# Patient Record
Sex: MALE | Race: WHITE | Employment: STUDENT | URBAN - NONMETROPOLITAN AREA
[De-identification: names, ages, dates, MRNs, and addresses within clinical notes are randomized per-mention and may not be internally consistent; named-entity substitution may affect disease eponyms.]

---
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
## 2021-11-30 NOTE — PROGRESS NOTES
11/30/21  @ 0476 Pt verbalizes understanding of PAT instructions. Pt is having H&P  done at [facility] through football department and will bring day of surgery.   Ester Liriano

## 2021-11-30 NOTE — PROGRESS NOTES
[address] [facility] patients having surgery or anesthesia are required to be Covid tested OR to have been vaccinated at least 14 days prior to your procedure. It is very important to return our call to [phone] and notify the staff of your last vaccination date otherwise you will be required to complete Covid PCR test within the 5-6 days prior to surgery & quarantine. The results will need to be faxed to PreAdmission Testing at [phone]. PRIOR TO PROCEDURE DATE:        1. PLEASE FOLLOW ANY  GUIDELINES/ INSTRUCTIONS PRIOR TO YOUR PROCEDURE AS ADVISED BY YOUR SURGEON. 2. Arrange for someone to drive you home and be with you for the first 24 hours after discharge for your safety after your procedure for which you received sedation. Ensure it is someone we can share information with regarding your discharge. 3. You must contact your surgeon for instructions IF:   You are taking any blood thinners, aspirin, anti-inflammatory or vitamin E.   There is a change in your physical condition such as a cold, fever, rash, cuts, sores or any other infection, especially near your surgical site. 4. Do not drink alcohol the day before or day of your procedure. 5. A Pre-op History and Physical for surgery MUST be completed by your Physician or Urgent Care within 30 days of your procedure date. Please bring a copy with you on the day of your procedure and along with any other testing performed. THE DAY OF YOUR PROCEDURE:  1. Follow instructions for ARRIVAL TIME as DIRECTED BY YOUR SURGEON. 2. Enter the MAIN entrance from [address] [place] [address] and follow the signs to the free [employer] or [employer] parking (offered free of charge 6am-5pm). 3. Enter the Main Entrance of the hospital (do not enter from the lower level of the parking garage). Upon entrance, check in with the  at the main desk on your left. while you are in surgery. 10. If you use a CPAP, please bring it with you on the day of your procedure. 11. We recommend that valuable personal  belongings such as cash, cell phones, e-tablets or jewelry, be left at home during your stay. The hospital will not be responsible for valuables that are not secured in the hospital safe. However, if your insurance requires a co-pay, you may want to bring a method of payment, i.e. Check or credit card, if you wish to pay your co-pay the day of surgery. 12. If you are to stay overnight, you may bring a bag with personal items. Please have any large items you may need brought in by your family after your arrival to your hospital room. 15. If you have a Living Will or Durable Power of , please bring a copy on the day of your procedure. 15. With your permission, one family member may accompany you while you are being prepared for surgery. Once you are ready, additional family members may join you. HOW WE KEEP YOU SAFE and WORK TO PREVENT SURGICAL SITE INFECTIONS:  1. Health care workers should always check your ID bracelet to verify your name and birth date. You will be asked many times to state your name, date of birth, and allergies. 2. Health care workers should always clean their hands with soap or alcohol gel before providing care to you. It is okay to ask anyone if they cleaned their hands before they touch you. 3. You will be actively involved in verifying the type of procedure you are having and ensuring the correct surgical site. This will be confirmed multiple times prior to your procedure. Do NOT matthew your surgery site UNLESS instructed to by your surgeon. 4. Do not shave or wax for 72 hours prior to procedure near your operative site. Shaving with a razor can irritate your skin and make it easier to develop an infection.  On the day of your procedure, any hair that needs to be removed near the surgical site will be clipped by a healthcare worker using a special clippers designed to avoid skin irritation. 5. When you are in the operating room, your surgical site will be cleansed with a special soap, and in most cases, you will be given an antibiotic before the surgery begins. What to expect AFTER YOUR PROCEDURE:  1. Immediately following your procedure, your will be taken to the PACU for the first phase of your recovery. Your nurse will help you recover from any potential side effects of anesthesia, such as extreme drowsiness, changes in your vital signs or breathing patterns. Nausea, headache, muscle aches, or sore throat may also occur after anesthesia. Your nurse will help you manage these potential side effects. 2. For comfort and safety, arrange to have someone at home with you for the first 24 hours after discharge. 3. You and your family will be given written instructions about your diet, activity, dressing care, medications, and return visits. 4. Once at home, should issues with nausea, pain, or bleeding occur, or should you notice any signs of infection, you should call your surgeon. 5. Always clean your hands before and after caring for your wound. Do not let your family touch your surgery site without cleaning their hands. 6. Narcotic pain medications can cause significant constipation. You may want to add a stool softener to your postoperative medication schedule or speak to your surgeon on how best to manage this SIDE EFFECT. SPECIAL INSTRUCTIONS Bring H&P day of surgery    Thank you for allowing us to care for you. We strive to exceed your expectations in the delivery of care and service provided to you and your family. If you need to contact the [place] [address] staff for any reason, please call us at [phone]    Instructions reviewed with patient during preadmission testing phone interview.   Una Lyn RN.11/30/2021 .2:14 PM      ADDITIONAL EDUCATIONAL INFORMATION REVIEWED PER PHONE WITH YOU AND/OR YOUR FAMILY:     Yes Antibacterial Soap

## 2021-12-01 NOTE — PROGRESS NOTES
Place patient label inside box (if no patient label, complete below)  Name:  :  MR#:   Meredith Sandoval / PROCEDURE  1. I (we), Flora Dave (Patient Name) authorize DR. Kathleen Canela (Provider / Yvon Winter) and/or such assistants as may be selected by him/her, to perform the following operation/procedure(s): OPERATIVE FIXATION OPEN REDUCTION INTERNAL FIXATION LEFT INDEX FINGER PROXIMAL PHALANX FRACTURE; ANY INDICATED PROCEDURES        Note: If unable to obtain consent prior to an emergent procedure, document the emergent reason in the medical record. This procedure has been explained to my (our) satisfaction and included in the explanation was:  A) The intended benefit, nature, and extent of the procedure to be performed;  B) The significant risks involved and the probability of success;  C) Alternative procedures and methods of treatment;  D) The dangers and probable consequences of such alternatives (including no procedure or treatment); E) The expected consequences of the procedure on my future health;  F) Whether other qualified individuals would be performing important surgical tasks and/or whether  would be present to advise or support the procedure. I (we) understand that there are other risks of infection and other serious complications in the pre-operative/procedural and postoperative/procedural stages of my (our) care. I (we) have asked all of the questions which I (we) thought were important in deciding whether or not to undergo treatment or diagnosis. These questions have been answered to my (our) satisfaction. I (we) understand that no assurance can be given that the procedure will be a success, and no guarantee or warranty of success has been given to me (us).     2. It has been explained to me (us) that during the course of the operation/procedure, unforeseen conditions may be revealed that necessitate extension of the original procedure(s) or different procedure(s) than those set forth in Paragraph 1. I (we) authorize and request that the above-named physician, his/her assistants or his/her designees, perform procedures as necessary and desirable if deemed to be in my (our) best interest.     Revised 8/2/2021                                                                          Page 1 of 2       3. I acknowledge that health care personnel may be observing this procedure for the purpose of medical education or other specified purposes as may be necessary as requested and/or approved by my (our) physician. 4. I (we) consent to the disposal by the hospital Pathologist of the removed tissue, parts or organs in accordance with hospital policy. 5. I do ____ do not ____ consent to the use of a local infiltration pain blocking agent that will be used by my provider/surgical provider to help alleviate pain during my procedure. 6. I do ____ do not ____ consent to an emergent blood transfusion in the case of a life-threatening situation that requires blood components to be administered. This consent is valid for 24 hours from the beginning of the procedure. 7. This patient does ____ or does not ____ currently have a DNR status/order. If DNR order is in place, obtain Addendum to the Surgical Consent for ALL Patients with a DNR Order to address renuka-operative status for limited intervention or DNR suspension.      8. I have read and fully understand the above Consent for Operation/Procedure and that all blanks were completed before I signed the consent.   _____________________________       _____________________      ____/____am/pm  Signature of Patient or legal representative      Printed Name / Relationship            Date / Time   ____________________________       _____________________      ____/____am/pm  Witness to Signature                                    Printed Name                    Date / Time     If patient is unable to sign or is a minor, complete the following)  Patient is a minor, ____ years of age, or unable to sign because:   ______________________________________________________________________________________________    Tierney If a phone consent is obtained, consent will be documented by using two health care professionals, each affirming that the consenting party has no questions and gives consent for the procedure discussed with the physician/provider.   _____________________          ____________________       _____/_____am/pm   2nd witness to phone consent        Printed name           Date / Time    Informed Consent:  I have provided the explanation described above in section 1 to the patient and/or legal representative.  I have provided the patient and/or legal representative with an opportunity to ask any questions about the proposed operation/procedure.   ___________________________          ____________________         ____/____am/pm  Provider / Proceduralist                            Printed name            Date / Time  Revised 8/2/2021                                                                      Page 2 of 2

## 2021-12-02 ENCOUNTER — ANESTHESIA EVENT (OUTPATIENT)
Dept: OPERATING ROOM | Age: 21
End: 2021-12-02
Payer: COMMERCIAL

## 2021-12-03 ENCOUNTER — HOSPITAL ENCOUNTER (OUTPATIENT)
Age: 21
Setting detail: OUTPATIENT SURGERY
Discharge: HOME | End: 2021-12-03
Attending: ORTHOPAEDIC SURGERY | Admitting: ORTHOPAEDIC SURGERY
Payer: COMMERCIAL

## 2021-12-03 ENCOUNTER — ANESTHESIA (OUTPATIENT)
Dept: OPERATING ROOM | Age: 21
End: 2021-12-03
Payer: COMMERCIAL

## 2021-12-03 VITALS
OXYGEN SATURATION: 99 % | HEIGHT: 76 IN | DIASTOLIC BLOOD PRESSURE: 85 MMHG | WEIGHT: 230 LBS | BODY MASS INDEX: 28.01 KG/M2 | TEMPERATURE: 98.5 F | RESPIRATION RATE: 16 BRPM | HEART RATE: 68 BPM | SYSTOLIC BLOOD PRESSURE: 153 MMHG

## 2021-12-03 VITALS — SYSTOLIC BLOOD PRESSURE: 130 MMHG | DIASTOLIC BLOOD PRESSURE: 71 MMHG | TEMPERATURE: 97 F | OXYGEN SATURATION: 100 %

## 2021-12-03 DIAGNOSIS — S62.611A CLOSED DISPLACED FRACTURE OF PROXIMAL PHALANX OF LEFT INDEX FINGER, INITIAL ENCOUNTER: Primary | ICD-10-CM

## 2021-12-03 PROCEDURE — C1713 ANCHOR/SCREW BN/BN,TIS/BN: HCPCS | Performed by: ORTHOPAEDIC SURGERY

## 2021-12-03 PROCEDURE — 7100000010 HC PHASE II RECOVERY - FIRST 15 MIN: Performed by: ORTHOPAEDIC SURGERY

## 2021-12-03 PROCEDURE — 7100000011 HC PHASE II RECOVERY - ADDTL 15 MIN: Performed by: ORTHOPAEDIC SURGERY

## 2021-12-03 PROCEDURE — 6360000002 HC RX W HCPCS: Performed by: NURSE ANESTHETIST, CERTIFIED REGISTERED

## 2021-12-03 PROCEDURE — 3600000004 HC SURGERY LEVEL 4 BASE: Performed by: ORTHOPAEDIC SURGERY

## 2021-12-03 PROCEDURE — 3700000000 HC ANESTHESIA ATTENDED CARE: Performed by: ORTHOPAEDIC SURGERY

## 2021-12-03 PROCEDURE — 6360000002 HC RX W HCPCS: Performed by: ORTHOPAEDIC SURGERY

## 2021-12-03 PROCEDURE — 7100000000 HC PACU RECOVERY - FIRST 15 MIN: Performed by: ORTHOPAEDIC SURGERY

## 2021-12-03 PROCEDURE — 2580000003 HC RX 258: Performed by: ORTHOPAEDIC SURGERY

## 2021-12-03 PROCEDURE — 2500000003 HC RX 250 WO HCPCS: Performed by: ORTHOPAEDIC SURGERY

## 2021-12-03 PROCEDURE — 2580000003 HC RX 258: Performed by: ANESTHESIOLOGY

## 2021-12-03 PROCEDURE — 2500000003 HC RX 250 WO HCPCS: Performed by: NURSE ANESTHETIST, CERTIFIED REGISTERED

## 2021-12-03 PROCEDURE — 2709999900 HC NON-CHARGEABLE SUPPLY: Performed by: ORTHOPAEDIC SURGERY

## 2021-12-03 PROCEDURE — 7100000001 HC PACU RECOVERY - ADDTL 15 MIN: Performed by: ORTHOPAEDIC SURGERY

## 2021-12-03 PROCEDURE — 3700000001 HC ADD 15 MINUTES (ANESTHESIA): Performed by: ORTHOPAEDIC SURGERY

## 2021-12-03 PROCEDURE — 3600000014 HC SURGERY LEVEL 4 ADDTL 15MIN: Performed by: ORTHOPAEDIC SURGERY

## 2021-12-03 DEVICE — IMPLANTABLE DEVICE: Type: IMPLANTABLE DEVICE | Site: INDEX FINGER | Status: FUNCTIONAL

## 2021-12-03 DEVICE — SCREW BNE L11MM DIA1.5MM CORT TI ST FULL THRD COMPR T4: Type: IMPLANTABLE DEVICE | Site: INDEX FINGER | Status: FUNCTIONAL

## 2021-12-03 DEVICE — SCREW BNE L9MM DIA1.5MM CORT TI ST FULL THRD COMPR T4: Type: IMPLANTABLE DEVICE | Site: INDEX FINGER | Status: FUNCTIONAL

## 2021-12-03 DEVICE — SCREW BNE L8MM DIA1.5MM CORT TI ST FULL THRD COMPR T4: Type: IMPLANTABLE DEVICE | Site: INDEX FINGER | Status: FUNCTIONAL

## 2021-12-03 RX ORDER — SODIUM CHLORIDE, SODIUM LACTATE, POTASSIUM CHLORIDE, CALCIUM CHLORIDE 600; 310; 30; 20 MG/100ML; MG/100ML; MG/100ML; MG/100ML
INJECTION, SOLUTION INTRAVENOUS CONTINUOUS
Status: DISCONTINUED | OUTPATIENT
Start: 2021-12-03 | End: 2021-12-03 | Stop reason: HOSPADM

## 2021-12-03 RX ORDER — PROMETHAZINE HYDROCHLORIDE 25 MG/ML
6.25 INJECTION, SOLUTION INTRAMUSCULAR; INTRAVENOUS
Status: DISCONTINUED | OUTPATIENT
Start: 2021-12-03 | End: 2021-12-03 | Stop reason: HOSPADM

## 2021-12-03 RX ORDER — LABETALOL HYDROCHLORIDE 5 MG/ML
5 INJECTION, SOLUTION INTRAVENOUS EVERY 10 MIN PRN
Status: DISCONTINUED | OUTPATIENT
Start: 2021-12-03 | End: 2021-12-03 | Stop reason: HOSPADM

## 2021-12-03 RX ORDER — DEXAMETHASONE SODIUM PHOSPHATE 4 MG/ML
INJECTION, SOLUTION INTRA-ARTICULAR; INTRALESIONAL; INTRAMUSCULAR; INTRAVENOUS; SOFT TISSUE PRN
Status: DISCONTINUED | OUTPATIENT
Start: 2021-12-03 | End: 2021-12-03 | Stop reason: SDUPTHER

## 2021-12-03 RX ORDER — OXYCODONE HYDROCHLORIDE 5 MG/1
5 TABLET ORAL PRN
Status: DISCONTINUED | OUTPATIENT
Start: 2021-12-03 | End: 2021-12-03 | Stop reason: HOSPADM

## 2021-12-03 RX ORDER — PROPOFOL 10 MG/ML
INJECTION, EMULSION INTRAVENOUS PRN
Status: DISCONTINUED | OUTPATIENT
Start: 2021-12-03 | End: 2021-12-03 | Stop reason: SDUPTHER

## 2021-12-03 RX ORDER — OXYCODONE HYDROCHLORIDE 5 MG/1
10 TABLET ORAL PRN
Status: DISCONTINUED | OUTPATIENT
Start: 2021-12-03 | End: 2021-12-03 | Stop reason: HOSPADM

## 2021-12-03 RX ORDER — HYDROCODONE BITARTRATE AND ACETAMINOPHEN 5; 325 MG/1; MG/1
1 TABLET ORAL EVERY 6 HOURS PRN
Qty: 18 TABLET | Refills: 0 | Status: SHIPPED | OUTPATIENT
Start: 2021-12-03 | End: 2021-12-08

## 2021-12-03 RX ORDER — MORPHINE SULFATE 4 MG/ML
1 INJECTION, SOLUTION INTRAMUSCULAR; INTRAVENOUS EVERY 5 MIN PRN
Status: DISCONTINUED | OUTPATIENT
Start: 2021-12-03 | End: 2021-12-03 | Stop reason: HOSPADM

## 2021-12-03 RX ORDER — METOCLOPRAMIDE HYDROCHLORIDE 5 MG/ML
10 INJECTION INTRAMUSCULAR; INTRAVENOUS
Status: DISCONTINUED | OUTPATIENT
Start: 2021-12-03 | End: 2021-12-03 | Stop reason: HOSPADM

## 2021-12-03 RX ORDER — HYDROMORPHONE HCL 110MG/55ML
PATIENT CONTROLLED ANALGESIA SYRINGE INTRAVENOUS PRN
Status: DISCONTINUED | OUTPATIENT
Start: 2021-12-03 | End: 2021-12-03 | Stop reason: SDUPTHER

## 2021-12-03 RX ORDER — FENTANYL CITRATE 50 UG/ML
INJECTION, SOLUTION INTRAMUSCULAR; INTRAVENOUS PRN
Status: DISCONTINUED | OUTPATIENT
Start: 2021-12-03 | End: 2021-12-03 | Stop reason: SDUPTHER

## 2021-12-03 RX ORDER — HYDRALAZINE HYDROCHLORIDE 20 MG/ML
5 INJECTION INTRAMUSCULAR; INTRAVENOUS EVERY 10 MIN PRN
Status: DISCONTINUED | OUTPATIENT
Start: 2021-12-03 | End: 2021-12-03 | Stop reason: HOSPADM

## 2021-12-03 RX ORDER — MIDAZOLAM HYDROCHLORIDE 1 MG/ML
INJECTION INTRAMUSCULAR; INTRAVENOUS PRN
Status: DISCONTINUED | OUTPATIENT
Start: 2021-12-03 | End: 2021-12-03 | Stop reason: SDUPTHER

## 2021-12-03 RX ORDER — DIPHENHYDRAMINE HYDROCHLORIDE 50 MG/ML
12.5 INJECTION INTRAMUSCULAR; INTRAVENOUS
Status: DISCONTINUED | OUTPATIENT
Start: 2021-12-03 | End: 2021-12-03 | Stop reason: HOSPADM

## 2021-12-03 RX ORDER — GLYCOPYRROLATE 1 MG/5 ML
SYRINGE (ML) INTRAVENOUS PRN
Status: DISCONTINUED | OUTPATIENT
Start: 2021-12-03 | End: 2021-12-03 | Stop reason: SDUPTHER

## 2021-12-03 RX ORDER — ROCURONIUM BROMIDE 10 MG/ML
INJECTION, SOLUTION INTRAVENOUS PRN
Status: DISCONTINUED | OUTPATIENT
Start: 2021-12-03 | End: 2021-12-03 | Stop reason: SDUPTHER

## 2021-12-03 RX ORDER — LIDOCAINE HYDROCHLORIDE 20 MG/ML
INJECTION, SOLUTION INTRAVENOUS PRN
Status: DISCONTINUED | OUTPATIENT
Start: 2021-12-03 | End: 2021-12-03 | Stop reason: SDUPTHER

## 2021-12-03 RX ORDER — ONDANSETRON 2 MG/ML
INJECTION INTRAMUSCULAR; INTRAVENOUS PRN
Status: DISCONTINUED | OUTPATIENT
Start: 2021-12-03 | End: 2021-12-03 | Stop reason: SDUPTHER

## 2021-12-03 RX ORDER — MAGNESIUM HYDROXIDE 1200 MG/15ML
LIQUID ORAL CONTINUOUS PRN
Status: COMPLETED | OUTPATIENT
Start: 2021-12-03 | End: 2021-12-03

## 2021-12-03 RX ORDER — MEPERIDINE HYDROCHLORIDE 25 MG/ML
12.5 INJECTION INTRAMUSCULAR; INTRAVENOUS; SUBCUTANEOUS EVERY 5 MIN PRN
Status: DISCONTINUED | OUTPATIENT
Start: 2021-12-03 | End: 2021-12-03 | Stop reason: HOSPADM

## 2021-12-03 RX ADMIN — PROPOFOL 350 MG: 10 INJECTION, EMULSION INTRAVENOUS at 07:42

## 2021-12-03 RX ADMIN — HYDROMORPHONE HYDROCHLORIDE 0.8 MG: 2 INJECTION, SOLUTION INTRAMUSCULAR; INTRAVENOUS; SUBCUTANEOUS at 08:19

## 2021-12-03 RX ADMIN — Medication 0.2 MG: at 07:40

## 2021-12-03 RX ADMIN — LIDOCAINE HYDROCHLORIDE 100 MG: 20 INJECTION, SOLUTION INTRAVENOUS at 07:40

## 2021-12-03 RX ADMIN — SUGAMMADEX 250 MG: 100 INJECTION, SOLUTION INTRAVENOUS at 09:35

## 2021-12-03 RX ADMIN — SODIUM CHLORIDE, SODIUM LACTATE, POTASSIUM CHLORIDE, AND CALCIUM CHLORIDE: .6; .31; .03; .02 INJECTION, SOLUTION INTRAVENOUS at 07:20

## 2021-12-03 RX ADMIN — SODIUM CHLORIDE, SODIUM LACTATE, POTASSIUM CHLORIDE, AND CALCIUM CHLORIDE: .6; .31; .03; .02 INJECTION, SOLUTION INTRAVENOUS at 09:00

## 2021-12-03 RX ADMIN — PROPOFOL 50 MG: 10 INJECTION, EMULSION INTRAVENOUS at 09:45

## 2021-12-03 RX ADMIN — CEFAZOLIN 2000 MG: 10 INJECTION, POWDER, FOR SOLUTION INTRAVENOUS at 07:44

## 2021-12-03 RX ADMIN — FENTANYL CITRATE 50 MCG: 50 INJECTION, SOLUTION INTRAMUSCULAR; INTRAVENOUS at 07:40

## 2021-12-03 RX ADMIN — HYDROMORPHONE HYDROCHLORIDE 0.6 MG: 2 INJECTION, SOLUTION INTRAMUSCULAR; INTRAVENOUS; SUBCUTANEOUS at 10:10

## 2021-12-03 RX ADMIN — ROCURONIUM BROMIDE 45 MG: 10 INJECTION INTRAVENOUS at 07:43

## 2021-12-03 RX ADMIN — PROPOFOL 50 MG: 10 INJECTION, EMULSION INTRAVENOUS at 07:43

## 2021-12-03 RX ADMIN — FENTANYL CITRATE 50 MCG: 50 INJECTION, SOLUTION INTRAMUSCULAR; INTRAVENOUS at 07:55

## 2021-12-03 RX ADMIN — ROCURONIUM BROMIDE 5 MG: 10 INJECTION INTRAVENOUS at 07:42

## 2021-12-03 RX ADMIN — MIDAZOLAM HYDROCHLORIDE 2 MG: 2 INJECTION, SOLUTION INTRAMUSCULAR; INTRAVENOUS at 07:25

## 2021-12-03 RX ADMIN — ONDANSETRON 4 MG: 2 INJECTION INTRAMUSCULAR; INTRAVENOUS at 08:15

## 2021-12-03 RX ADMIN — DEXAMETHASONE SODIUM PHOSPHATE 8 MG: 4 INJECTION, SOLUTION INTRAMUSCULAR; INTRAVENOUS at 08:15

## 2021-12-03 RX ADMIN — LIDOCAINE HYDROCHLORIDE 50 MG: 20 INJECTION, SOLUTION INTRAVENOUS at 09:45

## 2021-12-03 ASSESSMENT — PULMONARY FUNCTION TESTS
PIF_VALUE: 15
PIF_VALUE: 16
PIF_VALUE: 16
PIF_VALUE: 5
PIF_VALUE: 16
PIF_VALUE: 17
PIF_VALUE: 16
PIF_VALUE: 1
PIF_VALUE: 16
PIF_VALUE: 21
PIF_VALUE: 16
PIF_VALUE: 17
PIF_VALUE: 16
PIF_VALUE: 1
PIF_VALUE: 14
PIF_VALUE: 29
PIF_VALUE: 4
PIF_VALUE: 23
PIF_VALUE: 16
PIF_VALUE: 15
PIF_VALUE: 16
PIF_VALUE: 16
PIF_VALUE: 4
PIF_VALUE: 3
PIF_VALUE: 16
PIF_VALUE: 2
PIF_VALUE: 16
PIF_VALUE: 1
PIF_VALUE: 16
PIF_VALUE: 16
PIF_VALUE: 1
PIF_VALUE: 16
PIF_VALUE: 14
PIF_VALUE: 16
PIF_VALUE: 1
PIF_VALUE: 16
PIF_VALUE: 6
PIF_VALUE: 16
PIF_VALUE: 16
PIF_VALUE: 14
PIF_VALUE: 4
PIF_VALUE: 16
PIF_VALUE: 16
PIF_VALUE: 36
PIF_VALUE: 24
PIF_VALUE: 16
PIF_VALUE: 1
PIF_VALUE: 1
PIF_VALUE: 16
PIF_VALUE: 3
PIF_VALUE: 16
PIF_VALUE: 16
PIF_VALUE: 1
PIF_VALUE: 16
PIF_VALUE: 16
PIF_VALUE: 22
PIF_VALUE: 5
PIF_VALUE: 16
PIF_VALUE: 14
PIF_VALUE: 4
PIF_VALUE: 16
PIF_VALUE: 4
PIF_VALUE: 3
PIF_VALUE: 16
PIF_VALUE: 2
PIF_VALUE: 17
PIF_VALUE: 14
PIF_VALUE: 16
PIF_VALUE: 4
PIF_VALUE: 16
PIF_VALUE: 14
PIF_VALUE: 16
PIF_VALUE: 3
PIF_VALUE: 16
PIF_VALUE: 16
PIF_VALUE: 1
PIF_VALUE: 30
PIF_VALUE: 16
PIF_VALUE: 24
PIF_VALUE: 16
PIF_VALUE: 16
PIF_VALUE: 15
PIF_VALUE: 5
PIF_VALUE: 27
PIF_VALUE: 16
PIF_VALUE: 16
PIF_VALUE: 4
PIF_VALUE: 16
PIF_VALUE: 1
PIF_VALUE: 17
PIF_VALUE: 12
PIF_VALUE: 16
PIF_VALUE: 16
PIF_VALUE: 14
PIF_VALUE: 16
PIF_VALUE: 29
PIF_VALUE: 16
PIF_VALUE: 16
PIF_VALUE: 27
PIF_VALUE: 24
PIF_VALUE: 0
PIF_VALUE: 17
PIF_VALUE: 16
PIF_VALUE: 4
PIF_VALUE: 1
PIF_VALUE: 5
PIF_VALUE: 16
PIF_VALUE: 11
PIF_VALUE: 16
PIF_VALUE: 0
PIF_VALUE: 16
PIF_VALUE: 14
PIF_VALUE: 14

## 2021-12-03 ASSESSMENT — PAIN DESCRIPTION - PAIN TYPE: TYPE: SURGICAL PAIN

## 2021-12-03 ASSESSMENT — PAIN SCALES - GENERAL
PAINLEVEL_OUTOF10: 0
PAINLEVEL_OUTOF10: 8
PAINLEVEL_OUTOF10: 0
PAINLEVEL_OUTOF10: 0

## 2021-12-03 ASSESSMENT — PAIN DESCRIPTION - ONSET: ONSET: ON-GOING

## 2021-12-03 ASSESSMENT — PAIN - FUNCTIONAL ASSESSMENT: PAIN_FUNCTIONAL_ASSESSMENT: 0-10

## 2021-12-03 ASSESSMENT — PAIN DESCRIPTION - DESCRIPTORS: DESCRIPTORS: SHARP

## 2021-12-03 ASSESSMENT — PAIN DESCRIPTION - LOCATION: LOCATION: FINGER (COMMENT WHICH ONE)

## 2021-12-03 ASSESSMENT — PAIN DESCRIPTION - PROGRESSION: CLINICAL_PROGRESSION: GRADUALLY WORSENING

## 2021-12-03 ASSESSMENT — PAIN DESCRIPTION - ORIENTATION: ORIENTATION: RIGHT

## 2021-12-03 ASSESSMENT — PAIN DESCRIPTION - FREQUENCY: FREQUENCY: CONTINUOUS

## 2021-12-03 NOTE — ANESTHESIA POSTPROCEDURE EVALUATION
Department of Anesthesiology  Postprocedure Note    Patient: Candace January  MRN: [id number]  YOB: 2000  Date of evaluation: 12/3/2021  Time:  12:19 PM     Procedure Summary     Date: 12/03/21 Room / Location: [address]    Anesthesia Start: 4927 Anesthesia Stop: 1013    Procedure: OPERATIVE FIXATION OPEN REDUCTION INTERNAL FIXATION LEFT INDEX FINGER PROXIMAL PHALANX FRACTURE (Left ) Diagnosis:       Closed displaced fracture of proximal phalanx of left index finger, initial encounter      (Closed displaced fracture of proximal phalanx of left index finger, initial encounter [D23.249C])    Surgeons: Irene Sanchez MD Responsible Provider: Matthew Morel MD    Anesthesia Type: general ASA Status: 1          Anesthesia Type: general    Kristin Phase I: Kristin Score: 10    Kristin Phase II: Kristin Score: 10    Last vitals: Reviewed and per EMR flowsheets.        Anesthesia Post Evaluation    Patient location during evaluation: PACU  Patient participation: complete - patient participated  Level of consciousness: awake and alert  Pain score: 0  Airway patency: patent  Nausea & Vomiting: no nausea and no vomiting  Complications: no  Cardiovascular status: hemodynamically stable  Respiratory status: acceptable  Hydration status: euvolemic

## 2021-12-03 NOTE — H&P
I have reviewed the history and physical and examined the patient and find no relevant changes. I have reviewed with the patient and/or family the risks, benefits, and alternatives to the procedure.     Amy Arizmendi MD  12/3/2021

## 2021-12-03 NOTE — PROGRESS NOTES
PACU Transfer to [facility] #6    OPERATIVE FIXATION OPEN REDUCTION INTERNAL FIXATION LEFT INDEX FINGER PROXIMAL PHALANX FRACTURE     Dr Sonya Mae    Pt's Current Allergies: Patient has no known allergies. Pt meets criteria to transfer to next phase of care per Kobi Morel and ESDRAS standards    No results for input(s): POCGLU in the last 72 hours. Vitals:    12/03/21 1112   BP: 147/81   Pulse: (!) 49   Resp: 18   Temp: 98.4   SpO2: 98%      BP within 20% of pt's admitting BP as per Kristin Score      Intake/Output Summary (Last 24 hours) at 12/3/2021 1116  Last data filed at 12/3/2021 1112  Gross per 24 hour   Intake 1820 ml   Output 25 ml   Net 1795 ml     Drank water    Pain assessment:  none  Pain Level: 0  Had Local to site  Patient was assessed for unknown alterations to skin integrity. There were not unknown alterations observed. Patient transferred to care of Jovan Huggins RN.  Isabel GONZALEZ  Family updated and told that [facility] would contact    12/3/2021 11:16 AM

## 2021-12-03 NOTE — ANESTHESIA PRE PROCEDURE
Department of Anesthesiology  Preprocedure Note       Name:  Vincent Delgadillo   Age:  24 y.o.  :  2000                                          MRN:  [id number]         Date:  12/3/2021      Surgeon: Asuncion Mclaughlin):  Cristal Guerrero MD    Procedure: Procedure(s):  OPERATIVE FIXATION OPEN REDUCTION INTERNAL FIXATION LEFT INDEX FINGER PROXIMAL PHALANX FRACTURE; ANY INDICATED PROCEDURES    Medications prior to admission:   Prior to Admission medications    Not on File       Current medications:    Current Facility-Administered Medications   Medication Dose Route Frequency Provider Last Rate Last Admin    HYDROmorphone (DILAUDID) injection 0.25 mg  0.25 mg IntraVENous Q5 Min PRN Dontrell Aguirre MD        HYDROmorphone (DILAUDID) injection 0.5 mg  0.5 mg IntraVENous Q5 Min PRNEPTALI Aguirre MD        morphine injection 1 mg  1 mg IntraVENous Q5 Min PRNEPTALI Aguirre MD        HYDROmorphone (DILAUDID) injection 0.5 mg  0.5 mg IntraVENous Q5 Min PRNEPTALI Aguirre MD        oxyCODONE (ROXICODONE) immediate release tablet 5 mg  5 mg Oral PRN Dontrell Aguirre MD        Or    oxyCODONE (ROXICODONE) immediate release tablet 10 mg  10 mg Oral PRN Dontrell Aguirre MD        diphenhydrAMINE (BENADRYL) injection 12.5 mg  12.5 mg IntraVENous Once PRN Dontrell Aguirre MD        metoclopramide (REGLAN) injection 10 mg  10 mg IntraVENous Once PRN Dontrell Aguirre MD        promethazine (PHENERGAN) injection 6.25 mg  6.25 mg IntraVENous Once PRN Dontrell Aguirre MD        labetalol (NORMODYNE;TRANDATE) injection 5 mg  5 mg IntraVENous Q10 Min PRN Dontrell Aguirre MD        hydrALAZINE (APRESOLINE) injection 5 mg  5 mg IntraVENous Q10 Min PRN Dontrell Aguirre MD        meperidine (DEMEROL) injection 12.5 mg  12.5 mg IntraVENous Q5 Min PRN Dontrell Aguirre MD           Allergies:  No Known Allergies    Problem List:  There is no problem list on file for this patient. Past Medical History:  History reviewed.  No pertinent past medical history. Past Surgical History:        Procedure Laterality Date    KNEE SURGERY      SHOULDER SURGERY         Social History:    Social History     Tobacco Use    Smoking status: Never Smoker    Smokeless tobacco: Never Used   Substance Use Topics    Alcohol use: Yes     Comment: occaisionally                                Counseling given: Not Answered      Vital Signs (Current):   Vitals:    11/30/21 1410   Weight: 240 lb (108.9 kg)   Height: 6' 4\" (1.93 m)                                              BP Readings from Last 3 Encounters:   No data found for BP       NPO Status:                                                                                 BMI:   Wt Readings from Last 3 Encounters:   11/30/21 240 lb (108.9 kg)     Body mass index is 29.21 kg/m². CBC: No results found for: WBC, RBC, HGB, HCT, MCV, RDW, PLT    CMP: No results found for: NA, K, CL, CO2, BUN, CREATININE, GFRAA, AGRATIO, LABGLOM, GLUCOSE, PROT, CALCIUM, BILITOT, ALKPHOS, AST, ALT    POC Tests: No results for input(s): POCGLU, POCNA, POCK, POCCL, POCBUN, POCHEMO, POCHCT in the last 72 hours.     Coags: No results found for: PROTIME, INR, APTT    HCG (If Applicable): No results found for: PREGTESTUR, PREGSERUM, HCG, HCGQUANT     ABGs: No results found for: PHART, PO2ART, PWX6OPE, VQO0VWC, BEART, K2ILBNOX     Type & Screen (If Applicable):  No results found for: LABABO, LABRH    Drug/Infectious Status (If Applicable):  No results found for: HIV, HEPCAB    COVID-19 Screening (If Applicable): No results found for: COVID19        Anesthesia Evaluation  Patient summary reviewed and Nursing notes reviewed no history of anesthetic complications:   Airway: Mallampati: II  TM distance: >3 FB   Neck ROM: full  Mouth opening: > = 3 FB Dental:          Pulmonary:                              Cardiovascular:Negative CV ROS                      Neuro/Psych:   Negative Neuro/Psych ROS              GI/Hepatic/Renal: Neg GI/Hepatic/Renal ROS            Endo/Other: Negative Endo/Other ROS                    Abdominal:             Vascular: Other Findings:             Anesthesia Plan      general     ASA 3    (27-year-old male presents for OPERATIVE FIXATION OPEN REDUCTION INTERNAL FIXATION LEFT INDEX FINGER PROXIMAL PHALANX FRACTURE; ANY INDICATED PROCEDURES. Plan general anesthesia with ASA standard monitors. Questions answered. Patient agreeable with anesthetic plan.  )  Induction: intravenous. Anesthetic plan and risks discussed with patient. Plan discussed with CRNA.     Attending anesthesiologist reviewed and agrees with Irvin Medina MD   12/3/2021

## 2021-12-03 NOTE — BRIEF OP NOTE
Brief Postoperative Note      Patient: Ximena Spence  YOB: 2000  MRN: [id number]    Date of Procedure: 12/3/2021    Pre-Op Diagnosis: Closed displaced fracture of proximal phalanx of left index finger, initial encounter [S62.375F]    Post-Op Diagnosis: Same       Procedure(s):  OPERATIVE FIXATION OPEN REDUCTION INTERNAL FIXATION LEFT INDEX FINGER PROXIMAL PHALANX FRACTURE   2.  Interpretation of fluoroscopy 3 views left index finger    Surgeon(s):  Minnie Clark MD    Assistant:  Surgical Assistant: Ashley Knox    Anesthesia: General, local    Estimated Blood Loss (mL): 5ml    Complications: None immediate apparent    Specimens:   none  Implants:  Synthes 1.5mm VA hand locking plate with associated 1.5mm nonlocking screws    Drains: none    Findings: see fully dictated operative report    Electronically signed by Mary Jo Joseph MD on 12/3/2021 at 9:57 AM

## 2021-12-03 NOTE — H&P
Lucy Awad    [id number]    [facility] Same Day Surgery Update H & P  Department of General Surgery   Surgical Service   Pre-operative History and Physical  Last H & P within the last 30 days. DIAGNOSIS:   Closed displaced fracture of proximal phalanx of left index finger, initial encounter [S62.027W]    Procedure(s):  OPERATIVE FIXATION OPEN REDUCTION INTERNAL FIXATION LEFT INDEX FINGER PROXIMAL PHALANX FRACTURE; ANY INDICATED PROCEDURES    History obtained from: Patient interview and EHR      HISTORY OF PRESENT ILLNESS:   Patient with displaced proximal phalanx of the left index finger presents today for ORIF. Covid 19:  Patient denies fever, chills, worsening cough, or known exposure to Covid-19. Past Medical History:    History reviewed. No pertinent past medical history. Past Surgical History:        Procedure Laterality Date    KNEE SURGERY      SHOULDER SURGERY       Past Social History:  Social History     Socioeconomic History    Marital status: Single     Spouse name: None    Number of children: None    Years of education: None    Highest education level: None   Occupational History    None   Tobacco Use    Smoking status: Never Smoker    Smokeless tobacco: Never Used   Vaping Use    Vaping Use: Some days    Substances: Nicotine   Substance and Sexual Activity    Alcohol use: Yes     Comment: occaisionally    Drug use: Never    Sexual activity: None   Other Topics Concern    None   Social History Narrative    None     Social Determinants of Health     Financial Resource Strain:     Difficulty of Paying Living Expenses: Not on file   Food Insecurity:     Worried About Running Out of Food in the Last Year: Not on file    Kal of Food in the Last Year: Not on file   Transportation Needs:     Lack of Transportation (Medical): Not on file    Lack of Transportation (Non-Medical):  Not on file   Physical Activity:     Days of Exercise per Week: Not on file    Minutes of Exercise per Session: Not on file   Stress:     Feeling of Stress : Not on file   Social Connections:     Frequency of Communication with Friends and Family: Not on file    Frequency of Social Gatherings with Friends and Family: Not on file    Attends Latter day Services: Not on file    Active Member of Clubs or Organizations: Not on file    Attends Club or Organization Meetings: Not on file    Marital Status: Not on file   Intimate Partner Violence:     Fear of Current or Ex-Partner: Not on file    Emotionally Abused: Not on file    Physically Abused: Not on file    Sexually Abused: Not on file   Housing Stability:     Unable to Pay for Housing in the Last Year: Not on file    Number of [place] in the Last Year: Not on file    Unstable Housing in the Last Year: Not on file         Medications Prior to Admission:      None    Allergies:  Patient has no known allergies. PHYSICAL EXAM:      /74   Pulse (!) 45   Temp 98 °F (36.7 °C) (Tympanic)   Resp 16   Ht 6' 4\" (1.93 m)   Wt 230 lb (104.3 kg)   SpO2 98%   BMI 28.00 kg/m²      Airway:  Airway patent with no audible stridor    Heart:  Bradycardic, Regular rhythm, No murmur noted    Lungs:  No increased work of breathing, good air exchange, clear to auscultation bilaterally, no crackles or wheezing    Abdomen:  Soft, non-distended, non-tender, no rebound tenderness or guarding, and no masses palpated    ASSESSMENT AND PLAN     Patient is a 24 y.o. male with above specified procedure planned. 1.  The patients history and physical was obtained and signed off by the pre-admission testing department. Patient seen and focused exam done today- no new changes since last physical exam on 11/30/21    2. Access to ancillary services are available per request of the provider.     BROWN Schreiber - ABIODUN     12/3/2021

## 2021-12-03 NOTE — PROGRESS NOTES
OPERATIVE FIXATION OPEN REDUCTION INTERNAL FIXATION LEFT INDEX FINGER PROXIMAL PHALANX FRACTURE     Dr Emilia Munoz    Current Allergies: Patient has no known allergies. No results for input(s): POCGLU in the last 72 hours. Admitted to PACU bed 11 from OR. Arrived on a stretcher . Attached to PACU monitoring system. Alarms and parameters set. Report received from anesthesia personnel [address]. OR staff did not report skin issues that were observed while in OR  No problems reported intraoperatively. Pt arrived with oxygen per nasal cannula with oxygen at 4 liters. With oral airway in place. Athrombic wraps in place. Doctors aware of all labs before coming to recovery.

## 2021-12-03 NOTE — PROGRESS NOTES
Ambulatory Surgery/Procedure Discharge Note    Vitals:    12/03/21 1137   BP: (!) 153/85   Pulse: 68   Resp: 16   Temp: 98.5 °F (36.9 °C)   SpO2: 99%     BP meets leola standards. In: 0330 [P.O.:120; I.V.:1700]  Out: 25     Restroom use offered before discharge. Yes    Pain assessment:    Pain Level: 8 States pain gradually improving, ready for discharge    Dressing clean dry and intact to right hand using sling and ice. Patient discharged to home/self care.  Patient discharged via wheel chair by transporter to waiting family/S.O.       12/3/2021 12:42 PM

## 2021-12-03 NOTE — OP NOTE
Amanda Xavier (2000)    Date of Surgery- 12/3/2021    PREOPERATIVE DIAGNOSIS: Proximal phalanx fracture, left index finger    POSTOPERATIVE DIAGNOSIS: Same    PROCEDURE:  1. Open reduction and internal fixation of left index finger proximal phalanx fracture         2. Fluoroscopy of left hand and left index,    3 views with intraoperative interpretation    SURGEON:  Nancy Deleon MD M.D.    ASSISTANT: Jennifer Shah Surgical Assistant    ANESTHESIA: local, general    ESTIMATED BLOOD LOSS: 5 mL    COMPLICATIONS: None immediate apparent. WOUND CLASS: I    SPECIMEN: None    IMPLANTS: Synthes 1.5mm VA hand plate with associate 1.5mm nonlocking screws    ANTIBIOTIC PROPHYLAXIS:  See Anesthesia Note    DVT PROPHYLAXIS: See Anesthesia Note    Risks and benefits of surgical stabilization of the finger and fracture were explained at length with the patient. Operative site was marked by Dr. Emeli Salinas  in preoperative holding. All questions and concerns were addressed. The informed and signed consent was on the chart. Regional anesthetic was given without problem. OPERATIVE COURSE: Patient was taken to the operating room, placed in the  usual supine position. The left upper extremity was prepped and draped in the normal sterile fashion. Antibiotics were in place, and a final timeout  was held. Following exsanguination, tourniquet was inflated to 250 mmHg. left index finger was manipulated with fluoroscopy guidance; the fracture  was not aligning up adequately with closed measures; therefore, we converted  to an open procedure. Dorsal longitudinal midline incision was created over the left index proximal phalanx through skin only, meticulous hemostasis, and we elevated full-thickness skin flaps, protecting the dorsal tendons. Careful dissection was taken on the radial  and ulnar sides of the dorsal tendon apparatus.  There was a comminuted  fracture of the proximal phalanx that had a small longitudinal split extending towards but not to the MP joint . Early fracture hematoma was gently debrided under  loupe magnification. Care was taken to protect the volar tendon structures  and neurovascular bundles. We were able to manipulate the fracture fragments  back into an anatomic alignment, which was confirmed visually and with  fluoroscopy and held temporarily with two 0.045 K wires from the base of the proximal phalanx and extending across the fracture . At this point, a Synthes 1.5mm strut plate with associated 1.5mm cortical nonlocking screws was applied to the dorsal aspect of the proximal phalanx and cut and contoured for appropriate fit. A total of four screws distally and three screws proximally were applied   standard AO technique was used to plate the distal screw first placed in compression mode with all other screws placed in neutralization mode. We removed the clamps and k wires, and there was still  excellent stability. There was very nice alignment of the finger clinically and radiographically. Fingertip cascade was intact with no clinical evidence of malrotation or angulation. Final fluoroscopic images  were saved in the AP, lateral, and oblique views, confirming nice fracture  alignment and good placement of the hardware. No other abnormalities were  Noted. The wound was copiously irrigated. The tendon interval with lateral band was repaired with several interrupted 5.0 prolene sutures. . Skin  was closed with interrupted nylon suture. The tourniquet was deflated, and  all fingers were warm and well perfused, and a soft, sterile dressing was  placed followed by a splint. The patient tolerated the procedure well and was taken to the postanesthesia  recovery unit in good condition. No complications. Addendum:  Intraoperative fluoroscopy, with 3 views, was utilized for assessment of the left index finger, fracture reduction, and hardware placement.   The intraoperative fluoroscopy was performed and interpreted by Dr Pedro Luis Rascon. POSTOPERATIVE COURSE: Follow up in 7-10 days for wound inspection and possible  suture removal. Therapy referral for a hand-based, radial-gutter splint to  incorporate the index and middle fingers to the tips. Splint will be off for  gentle motion and edema control and hygiene. No strengthening or lifting for  6 weeks, followed by progressive activities.       Kylah Shepherd MD    Hand & Upper Extremity Surgery  [facility]

## 2022-09-07 ENCOUNTER — OFFICE VISIT (OUTPATIENT)
Dept: ORTHOPEDIC SURGERY | Age: 22
End: 2022-09-07
Payer: COMMERCIAL

## 2022-09-07 VITALS — BODY MASS INDEX: 28.01 KG/M2 | WEIGHT: 230 LBS | HEIGHT: 76 IN

## 2022-09-07 DIAGNOSIS — M25.572 LEFT ANKLE PAIN, UNSPECIFIED CHRONICITY: Primary | ICD-10-CM

## 2022-09-07 PROCEDURE — 99204 OFFICE O/P NEW MOD 45 MIN: CPT | Performed by: ORTHOPAEDIC SURGERY

## 2022-09-12 NOTE — PROGRESS NOTES
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 He presents for evaluation. He injured himself on 9/3/2022 during a football game. He injured himself with a twisting mechanism also with some contact with another player. He experienced immediate pain. X-rays at that time were performed and negative for fracture. Today he reports some improvement of his symptoms but still has discomfort throughout the deep and lateral aspects of the ankle. Medical History:  No past medical history on file. Past Surgical History:   Procedure Laterality Date    FINGER SURGERY Left 12/3/2021    OPERATIVE FIXATION OPEN REDUCTION INTERNAL FIXATION LEFT INDEX FINGER PROXIMAL PHALANX FRACTURE performed by Landry Farley MD at [address] [place] [address]        No family history on file. Social History     Socioeconomic History    Marital status: Single     Spouse name: Not on file    Number of children: Not on file    Years of education: Not on file    Highest education level: Not on file   Occupational History    Not on file   Tobacco Use    Smoking status: Never    Smokeless tobacco: Never   Vaping Use    Vaping Use: Some days    Substances: Nicotine   Substance and Sexual Activity    Alcohol use: Yes     Comment: occaisionally    Drug use: Never    Sexual activity: Not on file   Other Topics Concern    Not on file   Social History Narrative    Not on file     Social Determinants of Health     Financial Resource Strain: Not on file   Food Insecurity: Not on file   Transportation Needs: Not on file   Physical Activity: Not on file   Stress: Not on file   Social Connections: Not on file   Intimate Partner Violence: Not on file   Housing Stability: Not on file      No current outpatient medications on file prior to visit. No current facility-administered medications on file prior to visit. No Known Allergies     Review of Systems:  Constitutional: Patient is adequately groomed with no evidence of malnutrition  Mental Status: The patient is oriented to time, place and person. The patient's mood and affect are appropriate. Lymphatic: The lymphatic examination bilaterally reveals all areas to be without enlargement or induration. Vascular: Examination reveals no swelling or calf tenderness. Peripheral pulses are palpable and 2+. Neurological: The patient has good coordination. There is no weakness or sensory deficit. Skin:  Head/Neck: inspection reveals no rashes, ulcerations or lesions. Trunk: inspection reveals no rashes, ulcerations or lesions. Objective:  Ht 6' 4\" (1.93 m)   Wt 230 lb (104.3 kg)   BMI 28.00 kg/m²      Physical Exam:  The patient is well-appearing and in no apparent distress  Examination of the left ankle  No gross deformity  Mild swelling throughout the lateral aspect of the ankle  There is tenderness over the ATFL  No instability noted  5 out of 5 strength throughout distal muscle groups  Sensation is intact to light touch throughout all distributions  There is no calf swelling or tenderness  Palpable DP pulse, brisk cap refill, 2+ symmetric reflexes     Imaging:  3 view x-rays of the left ankle were obtained the office today on 9/7/2022 and reviewed. There is no acute fracture or dislocation. Small ossification adjacent to the distal fibula likely as a result of previous injury. Small osteophyte along the anterior distal fibula. Small ossification adjacent to the anterior distal fibula present. MRI of the left ankle was reviewed. Signal abnormality within the ATFL which likely is acute on chronic. Chronic osteophyte formation of the anterior distal tibia with small ossification representing loose body adjacent to this.       Assessment:  Left ankle injury sustained on 9/3/2022    Plan:  I discussed with the patient and the  the diagnosis and treatment options. I would treat him as an ankle sprain. Most of the imaging findings are more chronic in nature. In addition he is not having catching or locking and therefore I suspect that the small loose ossification is likely embedded within the anterior soft tissue. We will rehab him and he will return to play as symptoms permit. In the future if he does develop catching or locking or any mechanical symptoms we could consider loose body removal.    Greater than 45 minutes were spent with this encounter. Time spent included evaluating the patient's chart prior to arrival.  Evaluating the patient in the office including history, physical examination, imaging reviewing, and counseling on next steps. Lastly, time was spent discussing orders with my staff as well as providing documentation in the chart. Clay Montiel MD            Orthopaedic Surgery Sports Medicine and [address] and Glenna Hamm Physician [facility] ([place]      Disclaimer: This note was dictated with voice recognition software. Though review and correction are routine, we apologize for any errors.

## 2023-05-04 ENCOUNTER — NURSE ONLY (OUTPATIENT)
Dept: CARDIOLOGY CLINIC | Age: 23
End: 2023-05-04
Payer: COMMERCIAL

## 2023-05-04 DIAGNOSIS — Z02.5 SPORTS PHYSICAL: Primary | ICD-10-CM

## 2023-05-04 PROCEDURE — 93000 ELECTROCARDIOGRAM COMPLETE: CPT | Performed by: INTERNAL MEDICINE

## 2023-09-06 ENCOUNTER — OFFICE VISIT (OUTPATIENT)
Dept: ORTHOPEDIC SURGERY | Age: 23
End: 2023-09-06
Payer: COMMERCIAL

## 2023-09-06 ENCOUNTER — TELEPHONE (OUTPATIENT)
Dept: ORTHOPEDIC SURGERY | Age: 23
End: 2023-09-06

## 2023-09-06 VITALS — HEIGHT: 77 IN | WEIGHT: 250 LBS | BODY MASS INDEX: 29.52 KG/M2

## 2023-09-06 DIAGNOSIS — M25.562 LEFT KNEE PAIN, UNSPECIFIED CHRONICITY: Primary | ICD-10-CM

## 2023-09-06 PROCEDURE — 99204 OFFICE O/P NEW MOD 45 MIN: CPT | Performed by: ORTHOPAEDIC SURGERY

## 2023-09-06 NOTE — PROGRESS NOTES
Date:  2023    Name:  Luke Mendiola  Address:    [address] [place] [address]    :  2000      Age:   25 y.o.    SSN:  [id number]      Medical Record Number:  [id number]    Reason for Visit:    Chief Complaint    New Patient (Left knee)        DOS:2023     HPI: Luke Mendiola is a 25 y.o. male here today for evaluation of left knee injury. Patient is a senior at [employer] and was down in [place] this past weekend playing against the [address] on 2023 when he was hit on the lateral side of his left leg and suffered an injury to the knee. Noted a pop and significant swelling with the knee injury. He obtained an MRI the following day on 2023 which demonstrated complete rupture of the ACL as well is a grade 3 MCL sprain and associated undersurface tear of the anterior horn lateral meniscus. Patient's  placed him in a T ROM brace to stabilize the knee. He has been taking anti-inflammatories (Ibuprofen) to help with his discomfort. Pain Assessment  Location of Pain: Knee  Location Modifiers: Left  Severity of Pain: 2  Quality of Pain: Aching  Duration of Pain: Persistent  Frequency of Pain: Intermittent  Aggravating Factors: Bending  Limiting Behavior: Yes  Relieving Factors: Rest  Result of Injury: Yes  Work-Related Injury: No  Are there other pain locations you wish to document?: No  ROS: Review of systems reviewed from Patient History Form completed today and available in the patient's chart under the Media tab. No past medical history on file. Past Surgical History:   Procedure Laterality Date    FINGER SURGERY Left 12/3/2021    OPERATIVE FIXATION OPEN REDUCTION INTERNAL FIXATION LEFT INDEX FINGER PROXIMAL PHALANX FRACTURE performed by Sangeetha Sotomayor MD at [address] [place] [address]         No family history on file.     Social History     Socioeconomic History    Marital status: Single     Spouse

## 2023-09-06 NOTE — TELEPHONE ENCOUNTER
CPT: [id number]  AUTH: [employer] PER WEBSITE  INSURANCE: BCBS  LOCATION [facility]  AREA OF SX LT KNEE  NOTE: DOC SCANNED

## 2023-09-07 RX ORDER — ACETAMINOPHEN 325 MG/1
650 TABLET ORAL EVERY 6 HOURS PRN
Status: ON HOLD | COMMUNITY
End: 2023-09-12 | Stop reason: HOSPADM

## 2023-09-07 RX ORDER — IBUPROFEN 200 MG
200 TABLET ORAL EVERY 8 HOURS PRN
COMMUNITY

## 2023-09-07 NOTE — PROGRESS NOTES
[facility] PRE-SURGICAL TESTING INSTRUCTIONS                      PRIOR TO PROCEDURE DATE:    1. PLEASE FOLLOW ANY INSTRUCTIONS GIVEN TO YOU PER YOUR SURGEON. 2. Arrange for someone to drive you home and be with you for the first 24 hours after discharge for your safety after your procedure for which you received sedation. Ensure it is someone we can share information with regarding your discharge. NOTE: At this time ONLY 2 ADULTS may accompany you   One person ENCOURAGED to stay at hospital entire time if outpatient surgery      3. You must contact your surgeon for instructions IF:  You are taking any blood thinners, aspirin, anti-inflammatory or vitamins. There is a change in your physical condition such as a cold, fever, rash, cuts, sores, or any other infection, especially near your surgical site. 4. Do not drink alcohol the day before or day of your procedure. Do not use any recreational marijuana at least 24 hours or street drugs (heroin, cocaine) at minimum 5 days prior to your procedure. 5. A Pre-Surgical History and Physical MUST be completed WITHIN 30 DAYS OR LESS prior to your procedure. by your Physician or an Urgent Care        THE DAY OF YOUR PROCEDURE:  1. Follow instructions for ARRIVAL TIME as DIRECTED BY YOUR SURGEON. 2. Enter the MAIN entrance from [address] and follow the signs to the free Parking [email or web address] or [employer] (offered free of charge 7 am-5pm). 3. Enter the Main Entrance of the hospital (do not enter from the lower level of the parking garage). Upon entrance, check in with the  at the surgical information desk on your LEFT. Bring your insurance card and photo ID to register      4. DO NOT EAT ANYTHING 8 hours prior to arrival for surgery. You may have up to 8 ounces of water 4 hours prior to your arrival for surgery.    NOTE: ALL Gastric, Bariatric & Bowel surgery patients - you MUST follow your surgeon's instructions regarding

## 2023-09-07 NOTE — PROGRESS NOTES
Place patient label inside box (if no patient label, complete below)  Name:  :  MR#:   Alvaro Francisco / PROCEDURE  I (we), Soy Diamond (Patient Name) authorize DR. Lázaro Abdullahi  (Provider / Frank Roblero) and/or such assistants as may be selected by him/her, to perform the following operation/procedure(s): LEFT KNEE ARTHROSCOPY/ ANTERIOR CRUCIATE LIGAMENT RECONSTRUCTION WITH PATELLA TENDON/ LATERAL AND MEDIAL MENISCUS REPAIR VERSUS EXCISION/ MEDIAL COLLATERAL LIGAMENT REPAIR        Note: If unable to obtain consent prior to an emergent procedure, document the emergent reason in the medical record. This procedure has been explained to my (our) satisfaction and included in the explanation was: The intended benefit, nature, and extent of the procedure to be performed; The significant risks involved and the probability of success; Alternative procedures and methods of treatment; The dangers and probable consequences of such alternatives (including no procedure or treatment); The expected consequences of the procedure on my future health; Whether other qualified individuals would be performing important surgical tasks and/or whether  would be present to advise or support the procedure. I (we) understand that there are other risks of infection and other serious complications in the pre-operative/procedural and postoperative/procedural stages of my (our) care. I (we) have asked all of the questions which I (we) thought were important in deciding whether or not to undergo treatment or diagnosis. These questions have been answered to my (our) satisfaction. I (we) understand that no assurance can be given that the procedure will be a success, and no guarantee or warranty of success has been given to me (us).     It has been explained to me (us) that during the course of the operation/procedure, unforeseen conditions may be revealed that necessitate

## 2023-09-12 ENCOUNTER — ANESTHESIA EVENT (OUTPATIENT)
Dept: OPERATING ROOM | Age: 23
End: 2023-09-12
Payer: COMMERCIAL

## 2023-09-12 ENCOUNTER — HOSPITAL ENCOUNTER (OUTPATIENT)
Age: 23
Setting detail: OUTPATIENT SURGERY
Discharge: HOME | End: 2023-09-12
Attending: ORTHOPAEDIC SURGERY | Admitting: ORTHOPAEDIC SURGERY
Payer: COMMERCIAL

## 2023-09-12 ENCOUNTER — ANESTHESIA (OUTPATIENT)
Dept: OPERATING ROOM | Age: 23
End: 2023-09-12
Payer: COMMERCIAL

## 2023-09-12 VITALS
WEIGHT: 248 LBS | SYSTOLIC BLOOD PRESSURE: 131 MMHG | DIASTOLIC BLOOD PRESSURE: 82 MMHG | RESPIRATION RATE: 14 BRPM | HEART RATE: 77 BPM | HEIGHT: 77 IN | TEMPERATURE: 99.1 F | OXYGEN SATURATION: 98 % | BODY MASS INDEX: 29.28 KG/M2

## 2023-09-12 DIAGNOSIS — S83.512A RUPTURE OF ANTERIOR CRUCIATE LIGAMENT OF LEFT KNEE, INITIAL ENCOUNTER: Primary | ICD-10-CM

## 2023-09-12 PROCEDURE — 6360000002 HC RX W HCPCS: Performed by: ORTHOPAEDIC SURGERY

## 2023-09-12 PROCEDURE — C1713 ANCHOR/SCREW BN/BN,TIS/BN: HCPCS | Performed by: ORTHOPAEDIC SURGERY

## 2023-09-12 PROCEDURE — 2709999900 HC NON-CHARGEABLE SUPPLY: Performed by: ORTHOPAEDIC SURGERY

## 2023-09-12 PROCEDURE — 6370000000 HC RX 637 (ALT 250 FOR IP): Performed by: ORTHOPAEDIC SURGERY

## 2023-09-12 PROCEDURE — 6360000002 HC RX W HCPCS: Performed by: ANESTHESIOLOGY

## 2023-09-12 PROCEDURE — 7100000001 HC PACU RECOVERY - ADDTL 15 MIN: Performed by: ORTHOPAEDIC SURGERY

## 2023-09-12 PROCEDURE — 3700000001 HC ADD 15 MINUTES (ANESTHESIA): Performed by: ORTHOPAEDIC SURGERY

## 2023-09-12 PROCEDURE — 64447 NJX AA&/STRD FEMORAL NRV IMG: CPT | Performed by: ANESTHESIOLOGY

## 2023-09-12 PROCEDURE — 2580000003 HC RX 258: Performed by: ORTHOPAEDIC SURGERY

## 2023-09-12 PROCEDURE — 2720000010 HC SURG SUPPLY STERILE: Performed by: ORTHOPAEDIC SURGERY

## 2023-09-12 PROCEDURE — 3600000014 HC SURGERY LEVEL 4 ADDTL 15MIN: Performed by: ORTHOPAEDIC SURGERY

## 2023-09-12 PROCEDURE — 7100000010 HC PHASE II RECOVERY - FIRST 15 MIN: Performed by: ORTHOPAEDIC SURGERY

## 2023-09-12 PROCEDURE — 2500000003 HC RX 250 WO HCPCS: Performed by: ORTHOPAEDIC SURGERY

## 2023-09-12 PROCEDURE — 7100000011 HC PHASE II RECOVERY - ADDTL 15 MIN: Performed by: ORTHOPAEDIC SURGERY

## 2023-09-12 PROCEDURE — 6360000002 HC RX W HCPCS: Performed by: NURSE ANESTHETIST, CERTIFIED REGISTERED

## 2023-09-12 PROCEDURE — 7100000000 HC PACU RECOVERY - FIRST 15 MIN: Performed by: ORTHOPAEDIC SURGERY

## 2023-09-12 PROCEDURE — 3600000004 HC SURGERY LEVEL 4 BASE: Performed by: ORTHOPAEDIC SURGERY

## 2023-09-12 PROCEDURE — 2580000003 HC RX 258: Performed by: FAMILY MEDICINE

## 2023-09-12 PROCEDURE — A4217 STERILE WATER/SALINE, 500 ML: HCPCS | Performed by: ORTHOPAEDIC SURGERY

## 2023-09-12 PROCEDURE — 3700000000 HC ANESTHESIA ATTENDED CARE: Performed by: ORTHOPAEDIC SURGERY

## 2023-09-12 DEVICE — SCREW INTFR L20MM DIA8MM KNEE TI CANN FULL THRD: Type: IMPLANTABLE DEVICE | Site: KNEE | Status: FUNCTIONAL

## 2023-09-12 DEVICE — ANCHOR SUT L14MM OD4.5MM BIOCOMP TWO NO 2 FIBERWIRE 4 NDL: Type: IMPLANTABLE DEVICE | Site: KNEE | Status: FUNCTIONAL

## 2023-09-12 DEVICE — SCREW INTFR L20MM DIA7MM CANN W/ SHTH: Type: IMPLANTABLE DEVICE | Site: KNEE | Status: FUNCTIONAL

## 2023-09-12 RX ORDER — ONDANSETRON 2 MG/ML
4 INJECTION INTRAMUSCULAR; INTRAVENOUS
Status: DISCONTINUED | OUTPATIENT
Start: 2023-09-12 | End: 2023-09-13 | Stop reason: HOSPADM

## 2023-09-12 RX ORDER — PROPOFOL 10 MG/ML
INJECTION, EMULSION INTRAVENOUS PRN
Status: DISCONTINUED | OUTPATIENT
Start: 2023-09-12 | End: 2023-09-12 | Stop reason: SDUPTHER

## 2023-09-12 RX ORDER — KETOROLAC TROMETHAMINE 30 MG/ML
INJECTION, SOLUTION INTRAMUSCULAR; INTRAVENOUS PRN
Status: DISCONTINUED | OUTPATIENT
Start: 2023-09-12 | End: 2023-09-12 | Stop reason: SDUPTHER

## 2023-09-12 RX ORDER — SODIUM CHLORIDE, SODIUM LACTATE, POTASSIUM CHLORIDE, AND CALCIUM CHLORIDE .6; .31; .03; .02 G/100ML; G/100ML; G/100ML; G/100ML
IRRIGANT IRRIGATION PRN
Status: DISCONTINUED | OUTPATIENT
Start: 2023-09-12 | End: 2023-09-12 | Stop reason: HOSPADM

## 2023-09-12 RX ORDER — OXYCODONE HYDROCHLORIDE AND ACETAMINOPHEN 5; 325 MG/1; MG/1
1 TABLET ORAL
Qty: 30 TABLET | Refills: 0 | Status: SHIPPED
Start: 2023-09-12 | End: 2023-09-12 | Stop reason: HOSPADM

## 2023-09-12 RX ORDER — DEXAMETHASONE SODIUM PHOSPHATE 4 MG/ML
INJECTION, SOLUTION INTRA-ARTICULAR; INTRALESIONAL; INTRAMUSCULAR; INTRAVENOUS; SOFT TISSUE PRN
Status: DISCONTINUED | OUTPATIENT
Start: 2023-09-12 | End: 2023-09-12 | Stop reason: SDUPTHER

## 2023-09-12 RX ORDER — FENTANYL CITRATE 50 UG/ML
INJECTION, SOLUTION INTRAMUSCULAR; INTRAVENOUS PRN
Status: DISCONTINUED | OUTPATIENT
Start: 2023-09-12 | End: 2023-09-12 | Stop reason: SDUPTHER

## 2023-09-12 RX ORDER — LIDOCAINE HCL/PF 100 MG/5ML
SYRINGE (ML) INJECTION PRN
Status: DISCONTINUED | OUTPATIENT
Start: 2023-09-12 | End: 2023-09-12 | Stop reason: SDUPTHER

## 2023-09-12 RX ORDER — DEXAMETHASONE SODIUM PHOSPHATE 10 MG/ML
INJECTION, SOLUTION INTRAMUSCULAR; INTRAVENOUS
Status: COMPLETED
Start: 2023-09-12 | End: 2023-09-12

## 2023-09-12 RX ORDER — MIDAZOLAM HYDROCHLORIDE 1 MG/ML
INJECTION INTRAMUSCULAR; INTRAVENOUS
Status: COMPLETED
Start: 2023-09-12 | End: 2023-09-12

## 2023-09-12 RX ORDER — ROPIVACAINE HYDROCHLORIDE 5 MG/ML
INJECTION, SOLUTION EPIDURAL; INFILTRATION; PERINEURAL
Status: COMPLETED
Start: 2023-09-12 | End: 2023-09-12

## 2023-09-12 RX ORDER — FENTANYL CITRATE 50 UG/ML
25 INJECTION, SOLUTION INTRAMUSCULAR; INTRAVENOUS EVERY 5 MIN PRN
Status: DISCONTINUED | OUTPATIENT
Start: 2023-09-12 | End: 2023-09-13 | Stop reason: HOSPADM

## 2023-09-12 RX ORDER — LABETALOL HYDROCHLORIDE 5 MG/ML
10 INJECTION, SOLUTION INTRAVENOUS
Status: DISCONTINUED | OUTPATIENT
Start: 2023-09-12 | End: 2023-09-13 | Stop reason: HOSPADM

## 2023-09-12 RX ORDER — OXYCODONE HYDROCHLORIDE 5 MG/1
5 TABLET ORAL
Qty: 28 TABLET | Refills: 0 | Status: SHIPPED | OUTPATIENT
Start: 2023-09-12 | End: 2023-09-19

## 2023-09-12 RX ORDER — SODIUM CHLORIDE 0.9 % (FLUSH) 0.9 %
5-40 SYRINGE (ML) INJECTION EVERY 12 HOURS SCHEDULED
Status: DISCONTINUED | OUTPATIENT
Start: 2023-09-12 | End: 2023-09-13 | Stop reason: HOSPADM

## 2023-09-12 RX ORDER — HYDRALAZINE HYDROCHLORIDE 20 MG/ML
10 INJECTION INTRAMUSCULAR; INTRAVENOUS
Status: DISCONTINUED | OUTPATIENT
Start: 2023-09-12 | End: 2023-09-13 | Stop reason: HOSPADM

## 2023-09-12 RX ORDER — SENNOSIDES 8.6 MG
1 TABLET ORAL DAILY
Qty: 30 TABLET | Refills: 0 | Status: SHIPPED | OUTPATIENT
Start: 2023-09-12

## 2023-09-12 RX ORDER — SODIUM CHLORIDE 0.9 % (FLUSH) 0.9 %
5-40 SYRINGE (ML) INJECTION PRN
Status: DISCONTINUED | OUTPATIENT
Start: 2023-09-12 | End: 2023-09-13 | Stop reason: HOSPADM

## 2023-09-12 RX ORDER — OXYCODONE HYDROCHLORIDE AND ACETAMINOPHEN 5; 325 MG/1; MG/1
1 TABLET ORAL EVERY 4 HOURS PRN
Status: COMPLETED | OUTPATIENT
Start: 2023-09-12 | End: 2023-09-12

## 2023-09-12 RX ORDER — ROPIVACAINE HYDROCHLORIDE 5 MG/ML
INJECTION, SOLUTION EPIDURAL; INFILTRATION; PERINEURAL PRN
Status: DISCONTINUED | OUTPATIENT
Start: 2023-09-12 | End: 2023-09-12 | Stop reason: SDUPTHER

## 2023-09-12 RX ORDER — HYDROMORPHONE HYDROCHLORIDE 2 MG/ML
INJECTION, SOLUTION INTRAMUSCULAR; INTRAVENOUS; SUBCUTANEOUS PRN
Status: DISCONTINUED | OUTPATIENT
Start: 2023-09-12 | End: 2023-09-12 | Stop reason: SDUPTHER

## 2023-09-12 RX ORDER — MIDAZOLAM HYDROCHLORIDE 1 MG/ML
INJECTION INTRAMUSCULAR; INTRAVENOUS PRN
Status: DISCONTINUED | OUTPATIENT
Start: 2023-09-12 | End: 2023-09-12 | Stop reason: SDUPTHER

## 2023-09-12 RX ORDER — ONDANSETRON 4 MG/1
4 TABLET, FILM COATED ORAL 3 TIMES DAILY PRN
Qty: 15 TABLET | Refills: 0 | Status: SHIPPED | OUTPATIENT
Start: 2023-09-12

## 2023-09-12 RX ORDER — FENTANYL CITRATE 50 UG/ML
INJECTION, SOLUTION INTRAMUSCULAR; INTRAVENOUS
Status: COMPLETED
Start: 2023-09-12 | End: 2023-09-12

## 2023-09-12 RX ORDER — HYDROMORPHONE HYDROCHLORIDE 1 MG/ML
0.5 INJECTION, SOLUTION INTRAMUSCULAR; INTRAVENOUS; SUBCUTANEOUS EVERY 5 MIN PRN
Status: DISCONTINUED | OUTPATIENT
Start: 2023-09-12 | End: 2023-09-13 | Stop reason: HOSPADM

## 2023-09-12 RX ORDER — PROCHLORPERAZINE EDISYLATE 5 MG/ML
5 INJECTION INTRAMUSCULAR; INTRAVENOUS
Status: DISCONTINUED | OUTPATIENT
Start: 2023-09-12 | End: 2023-09-13 | Stop reason: HOSPADM

## 2023-09-12 RX ORDER — ONDANSETRON 2 MG/ML
INJECTION INTRAMUSCULAR; INTRAVENOUS PRN
Status: DISCONTINUED | OUTPATIENT
Start: 2023-09-12 | End: 2023-09-12 | Stop reason: SDUPTHER

## 2023-09-12 RX ORDER — ASPIRIN 325 MG
325 TABLET ORAL DAILY
Qty: 21 TABLET | Refills: 0 | Status: SHIPPED | OUTPATIENT
Start: 2023-09-12 | End: 2023-10-03

## 2023-09-12 RX ORDER — SODIUM CHLORIDE 9 MG/ML
INJECTION, SOLUTION INTRAVENOUS PRN
Status: DISCONTINUED | OUTPATIENT
Start: 2023-09-12 | End: 2023-09-13 | Stop reason: HOSPADM

## 2023-09-12 RX ORDER — SODIUM CHLORIDE, SODIUM LACTATE, POTASSIUM CHLORIDE, CALCIUM CHLORIDE 600; 310; 30; 20 MG/100ML; MG/100ML; MG/100ML; MG/100ML
INJECTION, SOLUTION INTRAVENOUS CONTINUOUS
Status: DISCONTINUED | OUTPATIENT
Start: 2023-09-12 | End: 2023-09-12 | Stop reason: HOSPADM

## 2023-09-12 RX ORDER — DEXAMETHASONE SODIUM PHOSPHATE 10 MG/ML
INJECTION, SOLUTION INTRAMUSCULAR; INTRAVENOUS PRN
Status: DISCONTINUED | OUTPATIENT
Start: 2023-09-12 | End: 2023-09-12 | Stop reason: SDUPTHER

## 2023-09-12 RX ADMIN — DEXAMETHASONE SODIUM PHOSPHATE 8 MG: 4 INJECTION, SOLUTION INTRAMUSCULAR; INTRAVENOUS at 15:33

## 2023-09-12 RX ADMIN — HYDROMORPHONE HYDROCHLORIDE 0.5 MG: 1 INJECTION, SOLUTION INTRAMUSCULAR; INTRAVENOUS; SUBCUTANEOUS at 17:52

## 2023-09-12 RX ADMIN — FENTANYL CITRATE 50 MCG: 50 INJECTION, SOLUTION INTRAMUSCULAR; INTRAVENOUS at 13:01

## 2023-09-12 RX ADMIN — ONDANSETRON 4 MG: 2 INJECTION INTRAMUSCULAR; INTRAVENOUS at 15:33

## 2023-09-12 RX ADMIN — OXYCODONE AND ACETAMINOPHEN 1 TABLET: 5; 325 TABLET ORAL at 18:03

## 2023-09-12 RX ADMIN — SODIUM CHLORIDE, SODIUM LACTATE, POTASSIUM CHLORIDE, AND CALCIUM CHLORIDE: .6; .31; .03; .02 INJECTION, SOLUTION INTRAVENOUS at 12:55

## 2023-09-12 RX ADMIN — ROPIVACAINE HYDROCHLORIDE 30 ML: 5 INJECTION, SOLUTION EPIDURAL; INFILTRATION; PERINEURAL at 11:45

## 2023-09-12 RX ADMIN — DEXAMETHASONE SODIUM PHOSPHATE 6 MG: 10 INJECTION, SOLUTION INTRAMUSCULAR; INTRAVENOUS at 11:45

## 2023-09-12 RX ADMIN — SODIUM CHLORIDE, SODIUM LACTATE, POTASSIUM CHLORIDE, AND CALCIUM CHLORIDE: .6; .31; .03; .02 INJECTION, SOLUTION INTRAVENOUS at 14:26

## 2023-09-12 RX ADMIN — HYDROMORPHONE HYDROCHLORIDE 0.5 MG: 2 INJECTION, SOLUTION INTRAMUSCULAR; INTRAVENOUS; SUBCUTANEOUS at 15:07

## 2023-09-12 RX ADMIN — HYDROMORPHONE HYDROCHLORIDE 0.5 MG: 2 INJECTION, SOLUTION INTRAMUSCULAR; INTRAVENOUS; SUBCUTANEOUS at 13:45

## 2023-09-12 RX ADMIN — Medication 50 MG: at 13:02

## 2023-09-12 RX ADMIN — FENTANYL CITRATE 50 MCG: 50 INJECTION, SOLUTION INTRAMUSCULAR; INTRAVENOUS at 12:59

## 2023-09-12 RX ADMIN — FENTANYL CITRATE 100 MCG: 50 INJECTION, SOLUTION INTRAMUSCULAR; INTRAVENOUS at 11:45

## 2023-09-12 RX ADMIN — MIDAZOLAM HYDROCHLORIDE 2 MG: 2 INJECTION, SOLUTION INTRAMUSCULAR; INTRAVENOUS at 11:45

## 2023-09-12 RX ADMIN — HYDROMORPHONE HYDROCHLORIDE 0.5 MG: 2 INJECTION, SOLUTION INTRAMUSCULAR; INTRAVENOUS; SUBCUTANEOUS at 14:39

## 2023-09-12 RX ADMIN — TRANEXAMIC ACID 1000 MG: 100 INJECTION, SOLUTION INTRAVENOUS at 15:25

## 2023-09-12 RX ADMIN — CEFAZOLIN 2000 MG: 2 INJECTION, POWDER, FOR SOLUTION INTRAMUSCULAR; INTRAVENOUS at 12:58

## 2023-09-12 RX ADMIN — PROPOFOL 300 MG: 10 INJECTION, EMULSION INTRAVENOUS at 13:02

## 2023-09-12 RX ADMIN — HYDROMORPHONE HYDROCHLORIDE 0.5 MG: 2 INJECTION, SOLUTION INTRAMUSCULAR; INTRAVENOUS; SUBCUTANEOUS at 13:38

## 2023-09-12 RX ADMIN — HYDROMORPHONE HYDROCHLORIDE 0.5 MG: 1 INJECTION, SOLUTION INTRAMUSCULAR; INTRAVENOUS; SUBCUTANEOUS at 17:27

## 2023-09-12 RX ADMIN — Medication 50 MG: at 13:04

## 2023-09-12 RX ADMIN — KETOROLAC TROMETHAMINE 30 MG: 30 INJECTION, SOLUTION INTRAMUSCULAR; INTRAVENOUS at 15:33

## 2023-09-12 ASSESSMENT — PAIN SCALES - GENERAL
PAINLEVEL_OUTOF10: 8
PAINLEVEL_OUTOF10: 0
PAINLEVEL_OUTOF10: 10
PAINLEVEL_OUTOF10: 2
PAINLEVEL_OUTOF10: 8
PAINLEVEL_OUTOF10: 8

## 2023-09-12 ASSESSMENT — PAIN DESCRIPTION - ORIENTATION
ORIENTATION: OUTER
ORIENTATION: LEFT
ORIENTATION: LEFT;OUTER

## 2023-09-12 ASSESSMENT — PAIN - FUNCTIONAL ASSESSMENT
PAIN_FUNCTIONAL_ASSESSMENT: ACTIVITIES ARE NOT PREVENTED

## 2023-09-12 ASSESSMENT — PAIN DESCRIPTION - FREQUENCY
FREQUENCY: CONTINUOUS

## 2023-09-12 ASSESSMENT — PAIN DESCRIPTION - PAIN TYPE
TYPE: SURGICAL PAIN

## 2023-09-12 ASSESSMENT — LIFESTYLE VARIABLES: SMOKING_STATUS: 0

## 2023-09-12 ASSESSMENT — PAIN DESCRIPTION - LOCATION
LOCATION: KNEE

## 2023-09-12 ASSESSMENT — PAIN DESCRIPTION - DESCRIPTORS
DESCRIPTORS: DULL;THROBBING
DESCRIPTORS: SHARP
DESCRIPTORS: DULL;THROBBING

## 2023-09-12 NOTE — PROGRESS NOTES
Sarah Padilla returned call. Ordering 5 oxycodone. Mom called - explained will need to take tylenol with oxycodone to equal percocet. She reports she has a neighbor to  scripts so do not need to rush him. Pain 8/10 0.5 Dilaudid given IV. Pepsi and crackers given for pain pill.

## 2023-09-12 NOTE — PROGRESS NOTES
Very sleepy. Reports pain down to 8/10. Goal to leave before 7 so can get to pharmacy. Discussed and agreed will take pill next.

## 2023-09-12 NOTE — BRIEF OP NOTE
Brief Postoperative Note      Patient: Tanner Marshall  YOB: 2000  MRN: [id number]    Date of Procedure: 9/12/2023    Pre-Op Diagnosis Codes:     * Complete tear of anterior cruciate ligament of left knee, initial encounter [S83.512A]    Post-Op Diagnosis:  Complete tear of anterior cruciate ligament of left knee . MCL tear, left knee       Procedure(s):  LEFT KNEE ARTHROSCOPY/ ANTERIOR CRUCIATE LIGAMENT RECONSTRUCTION WITH PATELLA BONE GRAFT/ MEDIAL COLLATERAL LIGAMENT REPAIR  . Surgeon(s): Bernardino Andrews MD    Assistant:  Surgical Assistant: Maddy Sanchez  Fellow: Stefan Tong MD    Anesthesia: General    Estimated Blood Loss (mL): less than 50     Complications: None    Specimens:   * No specimens in log *    Implants:  * No implants in log *      Drains: * No LDAs found *    Findings: Complete tear of ACL, MCL Incompetence with tibial avulsion, and intact menisci      Electronically signed by Stefan Tong MD on 9/12/2023 at 4:03 PM

## 2023-09-12 NOTE — ANESTHESIA PROCEDURE NOTES
Peripheral Block    Patient location during procedure: pre-op  Reason for block: procedure for pain, post-op pain management and at surgeon's request  Start time: 9/12/2023 11:45 AM  End time: 9/12/2023 11:50 AM  Staffing  Performed: anesthesiologist   Anesthesiologist: Moni Conroy DO  Performed by: Moni Conroy DO  Authorized by: Moni Conroy DO    Preanesthetic Checklist  Completed: patient identified, IV checked, site marked, risks and benefits discussed, surgical/procedural consents, equipment checked, pre-op evaluation, timeout performed, anesthesia consent given, oxygen available, monitors applied/VS acknowledged, fire risk safety assessment completed and verbalized and blood product R/B/A discussed and consented  Peripheral Block   Patient position: supine  Prep: ChloraPrep  Patient monitoring: cardiac monitor, continuous pulse ox, continuous capnometry, frequent blood pressure checks, IV access and oxygen  Block type: Femoral  Adductor canal  Laterality: left  Injection technique: single-shot  Guidance: ultrasound guided    Needle   Needle gauge: 22 G  Needle localization: anatomical landmarks and ultrasound guidance  Assessment   Injection assessment: negative aspiration for heme, no paresthesia on injection, local visualized surrounding nerve on ultrasound and no intravascular symptoms  Paresthesia pain: none  Slow fractionated injection: yes  Hemodynamics: stable  Real-time US image taken/store: yes  Outcomes: uncomplicated and patient tolerated procedure well    Additional Notes  Sterile prep. Timeout with SDS RN at 1145. 2 mg versed + 100 micrograms fentanyl IV for pt comfort. 30 mL 0.5% Ropivacaine + 6 mg PF decadron injected in 5 mL increments following negative aspiration. Tip of needle in view at all times. No pain or paresthesias on injection. Pt tolerated the procedure well.

## 2023-09-12 NOTE — PROGRESS NOTES
Dr Madelin Flores called about prescription issue. No answer. Message left will follow up with call to Dr Darlin Abraham if no response by 1800.

## 2023-09-12 NOTE — PROGRESS NOTES
Marge reports out of Percocet per family. Advised per pharmacy to request new script for 7.5 or plain oxycodone.

## 2023-09-12 NOTE — PROGRESS NOTES
Procedure: left adductor canal block  MD: Dr. Virginia Tobar performed at 1140  Pt monitored closely on heart monitor, 2L NC, continuous pulse oximetry, EtCO2, and frequent BPs. Pt remained alert and oriented x4. pt tolerated procedure well.

## 2023-09-12 NOTE — H&P
I have reviewed the history and physical and examined the patient and find no relevant changes. I have reviewed with the patient and/or family the risks, benefits, and alternatives to the procedure.     Kim Mcguire MD  9/12/2023

## 2023-09-12 NOTE — PROGRESS NOTES
3733 Admitted  from OR to PACU. Connected to monitor. Report at bedside. LMA in place as oral airway. No sign of pain or nausea.

## 2023-09-13 ENCOUNTER — OFFICE VISIT (OUTPATIENT)
Dept: ORTHOPEDIC SURGERY | Age: 23
End: 2023-09-13

## 2023-09-13 ENCOUNTER — HOSPITAL ENCOUNTER (OUTPATIENT)
Dept: PHYSICAL THERAPY | Age: 23
Setting detail: THERAPIES SERIES
Discharge: HOME | End: 2023-09-13
Payer: COMMERCIAL

## 2023-09-13 VITALS — HEIGHT: 77 IN | WEIGHT: 248 LBS | BODY MASS INDEX: 29.28 KG/M2

## 2023-09-13 DIAGNOSIS — M25.562 LEFT KNEE PAIN, UNSPECIFIED CHRONICITY: Primary | ICD-10-CM

## 2023-09-13 DIAGNOSIS — M25.462 EFFUSION OF LEFT KNEE: ICD-10-CM

## 2023-09-13 DIAGNOSIS — R26.9 GAIT DIFFICULTY: ICD-10-CM

## 2023-09-13 PROCEDURE — 99024 POSTOP FOLLOW-UP VISIT: CPT | Performed by: ORTHOPAEDIC SURGERY

## 2023-09-13 PROCEDURE — 97016 VASOPNEUMATIC DEVICE THERAPY: CPT | Performed by: PHYSICAL THERAPIST

## 2023-09-13 PROCEDURE — 97110 THERAPEUTIC EXERCISES: CPT | Performed by: PHYSICAL THERAPIST

## 2023-09-13 PROCEDURE — 97161 PT EVAL LOW COMPLEX 20 MIN: CPT | Performed by: PHYSICAL THERAPIST

## 2023-09-13 NOTE — ANESTHESIA POSTPROCEDURE EVALUATION
Department of Anesthesiology  Postprocedure Note    Patient: Jayson Peterson  MRN: [id number]  YOB: 2000  Date of evaluation: 9/12/2023      Procedure Summary     Date: 09/12/23 Room / Location: [facility] OR 09 / [facility] [address] [facility]    Anesthesia Start: 1252 Anesthesia Stop: 1700    Procedures:       LEFT KNEE ARTHROSCOPY/ ANTERIOR CRUCIATE LIGAMENT RECONSTRUCTION WITH PATELLA BONE GRAFT/ MEDIAL COLLATERAL LIGAMENT REPAIR (Left)      . (Left) Diagnosis:       Complete tear of anterior cruciate ligament of left knee, initial encounter      (Complete tear of anterior cruciate ligament of left knee, initial encounter [I15.545G])    Surgeons: Ga Mcdowell MD Responsible Provider: Pancho Villarreal DO    Anesthesia Type: general, regional ASA Status: 1          Anesthesia Type: No value filed.     Kristin Phase I: Kristin Score: 4    Kristin Phase II: Kristin Score: 10      Anesthesia Post Evaluation    Patient location during evaluation: PACU  Level of consciousness: awake  Complications: no  Multimodal analgesia pain management approach

## 2023-09-13 NOTE — FLOWSHEET NOTE
[address] [place] [address] and Therapy [address] [place] [address]., [address] [facility], [address] [place] [address] office: [phone] fax: [phone]      Physical Therapy: TREATMENT/PROGRESS NOTE   Patient: Taran Dia (15 y.o. male)   Treatment Date: 2023   :  2000 MRN: [id number]   Visit #: 1   Insurance: Payor:  SPECIALTY BILLING / Plan: [facility] / Product Type: Indemnity /   Insurance ID: [id number] - ([facility])  Secondary Insurance (if applicable): BCBS   Treatment Diagnosis:      ICD-10-CM    1. Left knee pain, unspecified chronicity  M25.562       2. Effusion of left knee  M25.462       3. Gait difficulty  R26.9          Medical Diagnosis:      Complete tear of anterior cruciate ligament of left knee, initial encounter [Z57.664X]   Referring Physician: Patricio Holland MD  PCP: No primary care provider on file.                              Plan of care signed (Y/N): NO    Date of Patient follow up with Physician: per MD     Progress Report/POC: EVAL today  PN due 10/13    POC update due (10 Rx/or 30 days whichever is less [address] [place] [address]): POC due        Visit # Insurance Allowable Auth Needed   C/Malvin Cuellar []Yes    []No     Latex Allergy:  [x]NO      []YES  Preferred Language for Healthcare:   [x]English       []other:    SUBJECTIVE EXAMINATION     Patient Report/Comments: see eval    OBJECTIVE EXAMINATION     Observation:     Test measurements: see eval     Test used Initial score 2023   Pain Summary VAS 5/10    Functional questionnaire LEFS 90% deficit    ROM        See eval                Strength  See eval                        RESTRICTIONS/PRECAUTIONS: TROM locked to avoid strain on MCL,  TTWB to start    Exercises/Interventions:     Therapeutic Ex (27482)  resistance Sets/time Reps Notes/Cues/Progressions   Ankle pumps        HS stretch seated        Gastroc stretch with strap                             SL abd   2 10

## 2023-09-13 NOTE — OP NOTE
[address] [facility],4Th Floor               [address]                                OPERATIVE REPORT    PATIENT NAME: Deyanira Hicks                   :        2000  MED REC NO:   [id number]                          ROOM:  ACCOUNT NO:   [de-identified]                           ADMIT DATE: 2023  PROVIDER:     Lydia Bowden MD    DATE OF PROCEDURE:  2023    OPERATIONS:  Left knee arthroscopy, ACL reconstruction with patellar  tendon autograft, partial lateral meniscectomy, MCL repair, patellar  tendon bone grafting. SURGEON:  Lydia Bowden MD    ASSISTANT:  HILDA Ibarra    ANESTHESIA:  Adductor canal block general.    PREOPERATIVE DIAGNOSES:  ACL tear, lateral meniscus tear, MCL tear, left  knee. POSTOPERATIVE DIAGNOSES:  ACL tear, lateral meniscus tear, MCL tear,  left knee. PREPARATION:  ChloraPrep. 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 He  suffered an ACL tear, MCL tear, lateral meniscus tear with bone bruise,  presents for arthroscopic evaluation and treatment. Risks and benefits  of surgery as well as nonsurgical alternatives were discussed in detail  with the patient who understood and consented to the operation. DESCRIPTION OF THE PROCEDURE:  I saw the patient in the holding area. He confirmed the left knee was the operative extremity. We initialed  the operative site, taken to the OR and after induction of general  anesthesia, a tourniquet was placed on the left thigh. Left leg was  prepped and draped in sterile fashion. Timeout was performed. The OR  team agreed the left knee was the operative site. The initials are  verified. The leg was exsanguinated with an Esmarch bandage. The  tourniquet was inflated to 275 mmHg. Incisions were made. Scope was  placed in the joint. Knee was visualized sequentially.

## 2023-09-15 ENCOUNTER — HOSPITAL ENCOUNTER (OUTPATIENT)
Dept: PHYSICAL THERAPY | Age: 23
Setting detail: THERAPIES SERIES
Discharge: HOME | End: 2023-09-15
Payer: COMMERCIAL

## 2023-09-15 ENCOUNTER — OFFICE VISIT (OUTPATIENT)
Dept: ORTHOPEDIC SURGERY | Age: 23
End: 2023-09-15

## 2023-09-15 DIAGNOSIS — M25.462 EFFUSION OF LEFT KNEE: ICD-10-CM

## 2023-09-15 DIAGNOSIS — R26.9 GAIT DIFFICULTY: ICD-10-CM

## 2023-09-15 DIAGNOSIS — M25.562 LEFT KNEE PAIN, UNSPECIFIED CHRONICITY: Primary | ICD-10-CM

## 2023-09-15 DIAGNOSIS — Z98.890 S/P ACL RECONSTRUCTION: Primary | ICD-10-CM

## 2023-09-15 PROCEDURE — 97112 NEUROMUSCULAR REEDUCATION: CPT | Performed by: PHYSICAL THERAPIST

## 2023-09-15 PROCEDURE — 97016 VASOPNEUMATIC DEVICE THERAPY: CPT | Performed by: PHYSICAL THERAPIST

## 2023-09-15 PROCEDURE — 97110 THERAPEUTIC EXERCISES: CPT | Performed by: PHYSICAL THERAPIST

## 2023-09-15 RX ORDER — OXYCODONE HYDROCHLORIDE AND ACETAMINOPHEN 5; 325 MG/1; MG/1
1 TABLET ORAL
Qty: 28 TABLET | Refills: 0 | Status: SHIPPED | OUTPATIENT
Start: 2023-09-15 | End: 2023-09-22

## 2023-09-15 NOTE — PROGRESS NOTES
Please see documentation from office visit for documentation of the encounter. Prescription was sent to pharmacy for patient for refill of his Percocet postoperatively.     Wilfredo Parker MD  Orthopedic Surgery Sports Medicine Fellow

## 2023-09-15 NOTE — PROGRESS NOTES
Chief Complaint  Post-Op Check ( Left knee arthroscopy, ACL reconstruction with patellar/tendon autograft, partial lateral meniscectomy, MCL repair, patellar/tendon bone grafting 09/12/2023.)      History of Present Illness:  Bre Oseguera is a pleasant 21 y.o. male  who presents today 3 day postop from a left knee ACL reconstruction with BTB autograft MCL repair, & partial lateral meniscectomy on 9/12/2023. Patient doing well at this time. Pain is well controlled. He is here because his effusion has returned and he is asking about a repeat aspiration. Medical History:  Patient's medications, allergies, past medical, surgical, social and family histories were reviewed and updated as appropriate. ROS: Review of systems reviewed from Patient History Form completed today and available in the patient's chart under the Media tab. Pertinent items are noted in HPI  Review of systems reviewed from Patient History Form completed today and available in the patient's chart under the Media tab. Vital Signs: There were no vitals taken for this visit. Left Knee Examination:    Gait: no use of assisted devices    Alignment: Alignment appreciated. Inspection/skin: Incisions healing well. No indication of infection. No dehiscence. Skin is intact without erythema or ecchymosis. No gross deformity. Palpation: No point tenderness. Nontender to light touch. Range of Motion: Able to flexion beyond 90 without significant discomfort. Strength: Able to perform straight leg raise. Mild quad weakness. Effusion: Minimal effusion    Ligamentous stability: Solid endpoint with Lachman's. No gross instability. Patella tracking: Smooth translation of patella. Neurologic and vascular: Skin is warm and well-perfused. Distally neurovascularly intact. Additional findings: Calf soft nontender. Sensation is intact to light-touch. No pretibial edema.        Radiology:       Pertinent imaging was

## 2023-09-15 NOTE — FLOWSHEET NOTE
[address] [facility] and Therapy [address] [place] [address]., [address] [facility], [address] [place] [address] office: [phone] fax: [phone]      Physical Therapy: TREATMENT/PROGRESS NOTE   Patient: Sherine Bowling (31 y.o. male)   Treatment Date: 09/15/2023   :  2000 MRN: [id number]   Visit #: 2   Insurance: Payor:  SPECIALTY BILLING / Plan: [facility] / Product Type: Indemnity /   Insurance ID: [id number] - ([facility])  Secondary Insurance (if applicable): BCBS   Treatment Diagnosis:      ICD-10-CM    1. Left knee pain, unspecified chronicity  M25.562       2. Effusion of left knee  M25.462       3. Gait difficulty  R26.9            Medical Diagnosis:      Complete tear of anterior cruciate ligament of left knee, initial encounter [B63.491F]   Referring Physician: Olu Michaud MD  PCP: No primary care provider on file.                              Plan of care signed (Y/N): yes    Date of Patient follow up with Physician: per MD     Progress Report/POC: EVAL today  PN due 10/13    POC update due (10 Rx/or 30 days whichever is less [address] [place] [address]): POC due        Visit # 639 [facility],  [address] []Yes    []No     Latex Allergy:  [x]NO      []YES  Preferred Language for Healthcare:   [x]English       []other:    SUBJECTIVE EXAMINATION     Patient Report/Comments: saw md,  drained knee again     OBJECTIVE EXAMINATION     Observation:     Test measurements: see eval     Test used Initial score 09/15/2023   Pain Summary VAS 6/10    Functional questionnaire LEFS 90% deficit    ROM        See eval                Strength  See eval                        RESTRICTIONS/PRECAUTIONS: TROM use to avoid strain on MCL,  TTWB to start, progress ROM protecting MCL , progress ROM as tolerated while protecting MCL  reviewed figure 4 to avoid strain on MCL also TROM on for exercise until good quad control to avoid strain on MCL    Exercises/Interventions:

## 2023-09-18 ENCOUNTER — HOSPITAL ENCOUNTER (OUTPATIENT)
Dept: PHYSICAL THERAPY | Age: 23
Setting detail: THERAPIES SERIES
Discharge: HOME | End: 2023-09-18
Payer: COMMERCIAL

## 2023-09-18 PROCEDURE — 97110 THERAPEUTIC EXERCISES: CPT

## 2023-09-18 PROCEDURE — 97016 VASOPNEUMATIC DEVICE THERAPY: CPT

## 2023-09-18 PROCEDURE — 97112 NEUROMUSCULAR REEDUCATION: CPT

## 2023-09-22 ENCOUNTER — HOSPITAL ENCOUNTER (OUTPATIENT)
Dept: PHYSICAL THERAPY | Age: 23
Setting detail: THERAPIES SERIES
Discharge: HOME | End: 2023-09-22
Payer: COMMERCIAL

## 2023-09-22 PROCEDURE — 97110 THERAPEUTIC EXERCISES: CPT

## 2023-09-22 PROCEDURE — 97112 NEUROMUSCULAR REEDUCATION: CPT

## 2023-09-22 NOTE — FLOWSHEET NOTE
details    Electronically Signed by Ananias Galeazzi, PT  Date: 09/22/2023     Note: If patient does not return for scheduled/recommended follow up visits, this note will serve as a discharge from care along with the most recent update on progress.

## 2023-09-25 ENCOUNTER — HOSPITAL ENCOUNTER (OUTPATIENT)
Dept: PHYSICAL THERAPY | Age: 23
Setting detail: THERAPIES SERIES
Discharge: HOME | End: 2023-09-25
Payer: COMMERCIAL

## 2023-09-25 PROCEDURE — 97110 THERAPEUTIC EXERCISES: CPT

## 2023-09-25 PROCEDURE — 97016 VASOPNEUMATIC DEVICE THERAPY: CPT

## 2023-09-25 PROCEDURE — 97112 NEUROMUSCULAR REEDUCATION: CPT

## 2023-09-25 PROCEDURE — 97140 MANUAL THERAPY 1/> REGIONS: CPT

## 2023-09-25 NOTE — FLOWSHEET NOTE
ROM performed to prevent loss of range of motion, maintain or improve muscular strength or increase flexibility, following either an injury or surgery. (75648) VASOPNEUMATIC    TREATMENT PLAN   Plan: POC Initiated today- see eval for details    Electronically Signed by Alberto Stanley PT  Date: 09/25/2023     Note: If patient does not return for scheduled/recommended follow up visits, this note will serve as a discharge from care along with the most recent update on progress.

## 2023-09-27 ENCOUNTER — HOSPITAL ENCOUNTER (OUTPATIENT)
Dept: PHYSICAL THERAPY | Age: 23
Setting detail: THERAPIES SERIES
Discharge: HOME | End: 2023-09-27
Payer: COMMERCIAL

## 2023-09-27 PROCEDURE — 97110 THERAPEUTIC EXERCISES: CPT

## 2023-09-27 PROCEDURE — 97112 NEUROMUSCULAR REEDUCATION: CPT

## 2023-09-27 PROCEDURE — 97140 MANUAL THERAPY 1/> REGIONS: CPT

## 2023-09-27 NOTE — FLOWSHEET NOTE
Status: [] Progressing: [] Met: [] Not Met: [] Adjusted  Patient will increase LE function to allow independence in all self-care activities. Status: [] Progressing: [] Met: [] Not Met: [] Adjusted (cut and paste from eval)    Overall Progression Towards Functional goals/ Treatment Progress Update:  [] Patient is progressing as expected towards functional goals listed. [] Progression is slowed due to complexities/Impairments listed. [] Progression has been slowed due to co-morbidities. [x] Plan just implemented, too soon (<30days) to assess goals progression   [] Goals require adjustment due to lack of progress  [] Patient is not progressing as expected and requires additional follow up with physician  [] Other:     CHARGE CAPTURE     CPT Code (TIMED) minutes # CPT Code (UNTIMED) #     [x] Therex (55093)  20 1  [] EVAL:LOW (23370 - Typically 20 minutes face-to-face)     [x] Neuromusc. Re-ed (82100) 15 1  [] Re-Eval (97195)     [x] Manual (22693) 15 1  [] Estim Unattended (22 729148)     [] Ther. Act (24978)    [] Delayne Good. Traction (40853)     [] Gait (84977)    [] Dry Needle 1-2 muscle (18255)     [] Aquatic Therex (05047)    [] Dry Needle 3+ muscle (79446)     [] Iontophoresis (55886)    [x] VASO (01082) 1    [] Ultrasound (46145)    [] Group Therapy (51277)     [] Estim Attended (21282)    [] Other: Total Timed Code Tx Minutes 50        Total Treatment Minutes 60        Charge Justification:  (42107) THERAPEUTIC EXERCISE - Provided verbal/tactile cueing for activities related to strengthening, flexibility, endurance, ROM performed to prevent loss of range of motion, maintain or improve muscular strength or increase flexibility, following either an injury or surgery.    (91666) [facility]- Reviewed/Progressed HEP activities related to strengthening, flexibility, endurance,

## 2023-09-29 ENCOUNTER — HOSPITAL ENCOUNTER (OUTPATIENT)
Dept: PHYSICAL THERAPY | Age: 23
Setting detail: THERAPIES SERIES
End: 2023-09-29
Payer: COMMERCIAL

## 2023-09-29 NOTE — FLOWSHEET NOTE
[address], [place] Office    Physical Therapy  Cancellation/No-show Note  Patient Name:  Yanni Tompkins  :  2000   Date:  2023  Cancelled visits to date: 0  No-shows to date: 1    For today's appointment patient:  []  Cancelled  []  Rescheduled appointment  [x]  No-show     Reason given by patient:  []  Patient ill  []  Conflicting appointment  []  No transportation    []  Conflict with work  [x]  No reason given  []  Other:     Comments:      Electronically signed by:  Maritza Carter, [facility]

## 2023-10-02 ENCOUNTER — HOSPITAL ENCOUNTER (OUTPATIENT)
Dept: PHYSICAL THERAPY | Age: 23
Setting detail: THERAPIES SERIES
Discharge: HOME | End: 2023-10-02
Payer: COMMERCIAL

## 2023-10-02 PROCEDURE — 97110 THERAPEUTIC EXERCISES: CPT

## 2023-10-02 PROCEDURE — 97112 NEUROMUSCULAR REEDUCATION: CPT

## 2023-10-02 NOTE — FLOWSHEET NOTE
[facility],Building [facility]., [address] [facility], [address] [place] [address] office: [phone] fax: [phone]      Physical Therapy: TREATMENT/PROGRESS NOTE   Patient: Taran Dia (67 y.o. male)   Treatment Date: 10/02/2023   :  2000 MRN: [id number]   Visit #: 4   Insurance: Payor: Yobany Juarezor / Plan: Randolph Pabon / Product Type: *No Product type* /   Insurance ID: [id number] - ([facility])  Secondary Insurance (if applicable): A [employer]   Treatment Diagnosis:    No diagnosis found. 1. Left knee pain, unspecified chronicity  M25.562         2. Effusion of left knee  M25.462         3. Gait difficulty  R26.9       Medical Diagnosis:      Complete tear of anterior cruciate ligament of left knee, initial encounter [W15.287Q]   Referring Physician: Patricio Holland MD  PCP: No primary care provider on file. Plan of care signed (Y/N): yes    Date of Patient follow up with Physician: wk of 10/1     Progress Report/POC: EVAL today  PN due 10/13    POC update due (10 Rx/or 30 days whichever is less [facility]): POC due        Visit # [address] []Yes    []No     Latex Allergy:  [x]NO      []YES  Preferred Language for Healthcare:   [x]English       []other:    SUBJECTIVE EXAMINATION     Patient Report/Comments: Patient enters without crutches today. No complaints of pain. Feeling like knee motion is improving.      OBJECTIVE EXAMINATION     Observation: 0-114 degrees    Test measurements:      Test used Initial score 10/02/2023   Pain Summary VAS 4-5/10    Functional questionnaire LEFS 90% deficit    ROM        See eval                Strength  See eval                        RESTRICTIONS/PRECAUTIONS: TROM use to avoid strain on MCL,  TTWB to start, progress ROM protecting MCL , progress ROM as tolerated while protecting MCL  reviewed figure 4 to avoid strain on MCL also TROM on for

## 2023-10-04 ENCOUNTER — HOSPITAL ENCOUNTER (OUTPATIENT)
Dept: PHYSICAL THERAPY | Age: 23
Setting detail: THERAPIES SERIES
Discharge: HOME | End: 2023-10-04
Payer: COMMERCIAL

## 2023-10-04 PROCEDURE — 97110 THERAPEUTIC EXERCISES: CPT

## 2023-10-04 PROCEDURE — 97140 MANUAL THERAPY 1/> REGIONS: CPT

## 2023-10-04 NOTE — FLOWSHEET NOTE
strength or increase flexibility, following either an injury or surgery. (28655) VASOPNEUMATIC    TREATMENT PLAN   Plan: POC Initiated today- see eval for details    Electronically Signed by German Vo PTA  Date: 10/04/2023     Note: If patient does not return for scheduled/recommended follow up visits, this note will serve as a discharge from care along with the most recent update on progress.

## 2023-10-05 ENCOUNTER — APPOINTMENT (OUTPATIENT)
Dept: PHYSICAL THERAPY | Age: 23
End: 2023-10-05
Payer: COMMERCIAL

## 2023-10-06 ENCOUNTER — HOSPITAL ENCOUNTER (OUTPATIENT)
Dept: PHYSICAL THERAPY | Age: 23
Setting detail: THERAPIES SERIES
Discharge: HOME | End: 2023-10-06
Payer: COMMERCIAL

## 2023-10-06 PROCEDURE — 97110 THERAPEUTIC EXERCISES: CPT

## 2023-10-06 PROCEDURE — 97140 MANUAL THERAPY 1/> REGIONS: CPT

## 2023-10-06 PROCEDURE — 97112 NEUROMUSCULAR REEDUCATION: CPT

## 2023-10-06 NOTE — FLOWSHEET NOTE
[facility],Building [facility]., [address] [facility], [address] [place] [address] office: [phone] fax: [phone]      Physical Therapy: TREATMENT/PROGRESS NOTE   Patient: Bhumi Quinteros (80 y.o. male)   Treatment Date: 10/06/2023   :  2000 MRN: [id number]   Visit #: 4   Insurance: Payor: Vincent Sessions / Plan: Sury Causey / Product Type: *No Product type* /   Insurance ID: [id number] - ([facility])  Secondary Insurance (if applicable): A [employer]   Treatment Diagnosis:    No diagnosis found. 1. Left knee pain, unspecified chronicity  M25.562         2. Effusion of left knee  M25.462         3. Gait difficulty  R26.9       Medical Diagnosis:      Complete tear of anterior cruciate ligament of left knee, initial encounter [D21.862P]   Referring Physician: Ankita Marie MD  PCP: No primary care provider on file. Plan of care signed (Y/N): yes    Date of Patient follow up with Physician: wk of 10/1     Progress Report/POC: EVAL today  PN due 10/13    POC update due (10 Rx/or 30 days whichever is less [address]): POC due        Visit # [address] [facility] []Yes    []No     Latex Allergy:  [x]NO      []YES  Preferred Language for Healthcare:   [x]English       []other:    SUBJECTIVE EXAMINATION     Patient Report/Comments: Patient reports left knee is a little sore today. Overall doing well.       OBJECTIVE EXAMINATION     Observation: 0-114 degrees    Test measurements:      Test used Initial score 10/06/2023   Pain Summary VAS 4-5/10    Functional questionnaire LEFS 90% deficit    ROM        See eval                Strength  See eval                        RESTRICTIONS/PRECAUTIONS: TROM use to avoid strain on MCL,  TTWB to start, progress ROM protecting MCL , progress ROM as tolerated while protecting MCL  reviewed figure 4 to avoid strain on MCL also TROM on for exercise until good quad control

## 2023-10-09 ENCOUNTER — HOSPITAL ENCOUNTER (OUTPATIENT)
Dept: PHYSICAL THERAPY | Age: 23
Setting detail: THERAPIES SERIES
Discharge: HOME | End: 2023-10-09
Payer: COMMERCIAL

## 2023-10-09 PROCEDURE — 97140 MANUAL THERAPY 1/> REGIONS: CPT

## 2023-10-09 PROCEDURE — 97110 THERAPEUTIC EXERCISES: CPT

## 2023-10-09 PROCEDURE — 97112 NEUROMUSCULAR REEDUCATION: CPT

## 2023-10-09 NOTE — FLOWSHEET NOTE
and progress balance     Electronically Signed by Willie Salas, CHAITANYA  Date: 10/09/2023     Note: If patient does not return for scheduled/recommended follow up visits, this note will serve as a discharge from care along with the most recent update on progress.

## 2023-10-11 ENCOUNTER — OFFICE VISIT (OUTPATIENT)
Dept: ORTHOPEDIC SURGERY | Age: 23
End: 2023-10-11

## 2023-10-11 VITALS — HEIGHT: 77 IN | BODY MASS INDEX: 29.28 KG/M2 | WEIGHT: 248 LBS

## 2023-10-11 DIAGNOSIS — Z98.890 S/P ACL RECONSTRUCTION: Primary | ICD-10-CM

## 2023-10-11 PROCEDURE — 99024 POSTOP FOLLOW-UP VISIT: CPT | Performed by: ORTHOPAEDIC SURGERY

## 2023-10-12 ENCOUNTER — HOSPITAL ENCOUNTER (OUTPATIENT)
Dept: PHYSICAL THERAPY | Age: 23
Setting detail: THERAPIES SERIES
Discharge: HOME | End: 2023-10-12
Payer: COMMERCIAL

## 2023-10-12 PROCEDURE — 97112 NEUROMUSCULAR REEDUCATION: CPT

## 2023-10-12 PROCEDURE — 97110 THERAPEUTIC EXERCISES: CPT

## 2023-10-12 PROCEDURE — 97140 MANUAL THERAPY 1/> REGIONS: CPT

## 2023-10-12 NOTE — FLOWSHEET NOTE
[facility],Building [facility]., [address] [facility], [address] [place] [address] office: [phone] fax: [phone]      Physical Therapy: TREATMENT/PROGRESS NOTE   Patient: Margaret Palacio (33 y.o. male)   Treatment Date: 10/12/2023   :  2000 MRN: [id number]   Visit #: 4   Insurance: Payor: Smiley Palmer / Plan: Christiano Galvez / Product Type: *No Product type* /   Insurance ID: [id number] - ([facility])  Secondary Insurance (if applicable): A [employer]   Treatment Diagnosis:    No diagnosis found. 1. Left knee pain, unspecified chronicity  M25.562         2. Effusion of left knee  M25.462         3. Gait difficulty  R26.9       Medical Diagnosis:      Complete tear of anterior cruciate ligament of left knee, initial encounter [U78.933I]   Referring Physician: Suzanne Infante MD  PCP: No primary care provider on file. Plan of care signed (Y/N): yes    Date of Patient follow up with Physician: wk of 10/1     Progress Report/POC: EVAL today  PN due 10/13    POC update due (10 Rx/or 30 days whichever is less [facility]): POC due        Visit # 700 [facility] []Yes    []No     Latex Allergy:  [x]NO      []YES  Preferred Language for Healthcare:   [x]English       []other:    SUBJECTIVE EXAMINATION     Patient Report/Comments: Patient states left knee is feeling good overall. His doctor started him on an anti-inflammatory medication to help with the residual swelling. Sabas Briceno reports feeling some tightness through the hamstring and back of the knee today.     OBJECTIVE EXAMINATION     Observation: 0-114 degrees    Test measurements:      Test used Initial score 10/12/2023   Pain Summary VAS 4-5/10    Functional questionnaire LEFS 90% deficit    ROM        See eval                Strength  See eval                        RESTRICTIONS/PRECAUTIONS: TROM use to avoid strain on MCL,  TTWB to start,

## 2023-10-16 ENCOUNTER — HOSPITAL ENCOUNTER (OUTPATIENT)
Dept: PHYSICAL THERAPY | Age: 23
Setting detail: THERAPIES SERIES
Discharge: HOME | End: 2023-10-16
Payer: COMMERCIAL

## 2023-10-16 PROCEDURE — 97112 NEUROMUSCULAR REEDUCATION: CPT

## 2023-10-16 PROCEDURE — 97140 MANUAL THERAPY 1/> REGIONS: CPT

## 2023-10-16 PROCEDURE — 97110 THERAPEUTIC EXERCISES: CPT

## 2023-10-18 ENCOUNTER — HOSPITAL ENCOUNTER (OUTPATIENT)
Dept: PHYSICAL THERAPY | Age: 23
Setting detail: THERAPIES SERIES
Discharge: HOME | End: 2023-10-18
Payer: COMMERCIAL

## 2023-10-18 PROCEDURE — 97112 NEUROMUSCULAR REEDUCATION: CPT

## 2023-10-18 PROCEDURE — 97140 MANUAL THERAPY 1/> REGIONS: CPT

## 2023-10-18 PROCEDURE — 97110 THERAPEUTIC EXERCISES: CPT

## 2023-10-18 NOTE — FLOWSHEET NOTE
[address] [place] [address] and Therapy [address] [place] [address] office: [phone] fax: [phone]        Physical Therapy Re-Certification Plan of Care/MD UPDATE      Physical Therapy: TREATMENT/PROGRESS NOTE   Patient: Jd Spencer (89 y.o. male)   Treatment Date: 10/18/2023   :  2000 MRN: [id number]   Visit #: 12   Insurance: Payor: Miguel Art / Plan: Teresa Marquis / Product Type: *No Product type* /   Insurance ID: [id number] - ([facility])  Secondary Insurance (if applicable): A [employer]   Treatment Diagnosis:    No diagnosis found. 1. Left knee pain, unspecified chronicity  M25.562         2. Effusion of left knee  M25.462         3. Gait difficulty  R26.9       Medical Diagnosis:      Complete tear of anterior cruciate ligament of left knee, initial encounter [T65.359Z]   Referring Physician: Nathan Mejias MD  PCP: No primary care provider on file. Plan of care signed (Y/N): yes    Date of Patient follow up with Physician: wk of 10/1     Progress Report/POC: NO  PN due 10/13    POC update due (10 Rx/or 30 days whichever is less [address] [place] [address]): POC due        Visit # 2817 Lionel Rae []Yes    []No     Latex Allergy:  [x]NO      []YES  Preferred Language for Healthcare:   [x]English       []other:    SUBJECTIVE EXAMINATION     Patient Report/Comments: Patient states feeling much better through left knee and seems to be able to get into extension with much more comfort.      OBJECTIVE EXAMINATION     Observation:     Test measurements:      Test used Initial score 10/18/2023   Pain Summary VAS 4-510    Functional questionnaire LEFS 90% deficit 38%   ROM        See eval 4-132               Strength  See eval 5/5 quad      5/5 ham                 RESTRICTIONS/PRECAUTIONS: TROM use to avoid strain on MCL,  TTWB to start, progress ROM protecting MCL , progress ROM as tolerated while

## 2023-10-20 ENCOUNTER — HOSPITAL ENCOUNTER (OUTPATIENT)
Dept: PHYSICAL THERAPY | Age: 23
Setting detail: THERAPIES SERIES
Discharge: HOME | End: 2023-10-20
Payer: COMMERCIAL

## 2023-10-20 PROCEDURE — 97140 MANUAL THERAPY 1/> REGIONS: CPT

## 2023-10-20 PROCEDURE — 97110 THERAPEUTIC EXERCISES: CPT

## 2023-10-20 PROCEDURE — 97112 NEUROMUSCULAR REEDUCATION: CPT

## 2023-10-20 NOTE — FLOWSHEET NOTE
[address] [place] [address] and Therapy [address] [place] [address] office: [phone] fax: [phone]        Physical Therapy Re-Certification Plan of Care/MD UPDATE      Physical Therapy: TREATMENT/PROGRESS NOTE   Patient: Nick Mueller (33 y.o. male)   Treatment Date: 10/20/2023   :  2000 MRN: [id number]   Visit #: 12   Insurance: Payor: Ed Augustine / Plan: Winifred Shields / Product Type: *No Product type* /   Insurance ID: [id number] - ([facility])  Secondary Insurance (if applicable): A [employer]   Treatment Diagnosis:    No diagnosis found. 1. Left knee pain, unspecified chronicity  M25.562         2. Effusion of left knee  M25.462         3. Gait difficulty  R26.9       Medical Diagnosis:      Complete tear of anterior cruciate ligament of left knee, initial encounter [N42.450O]   Referring Physician: Janett Moulton MD  PCP: No primary care provider on file. Plan of care signed (Y/N): yes    Date of Patient follow up with Physician: wk of 10/1     Progress Report/POC: NO  PN due 10/13    POC update due (10 Rx/or 30 days whichever is less [facility]): POC due        Visit # Insurance Allowable Auth Needed   [address] [place] [address] []Yes    []No     Latex Allergy:  [x]NO      []YES  Preferred Language for Healthcare:   [x]English       []other:    SUBJECTIVE EXAMINATION     Patient Report/Comments: Patient states feeling much better through left knee and seems to be able to get into extension with much more comfort- more so later in the day but still is stiff into extension in the morning.       OBJECTIVE EXAMINATION     Observation:     Test measurements:      Test used Initial score 10/20/2023   Pain Summary VAS 4-5/10    Functional questionnaire LEFS 90% deficit 38%   ROM        See eval 4-132               Strength  See eval 5/5 quad      5/5 ham                 RESTRICTIONS/PRECAUTIONS: TROM use to avoid strain on MCL,

## 2023-10-23 ENCOUNTER — HOSPITAL ENCOUNTER (OUTPATIENT)
Dept: PHYSICAL THERAPY | Age: 23
Setting detail: THERAPIES SERIES
Discharge: HOME | End: 2023-10-23
Payer: COMMERCIAL

## 2023-10-23 PROCEDURE — 97140 MANUAL THERAPY 1/> REGIONS: CPT

## 2023-10-23 PROCEDURE — 97110 THERAPEUTIC EXERCISES: CPT

## 2023-10-23 PROCEDURE — 97112 NEUROMUSCULAR REEDUCATION: CPT

## 2023-10-23 NOTE — FLOWSHEET NOTE
on for exercise until good quad control to avoid strain on MCL    Exercises/Interventions:     Therapeutic Ex (95645) 22 resistance Sets/time Reps Notes/Cues/Progressions   Hamstring stretch- seated       Lateral side step red 15' 2    SL RDL blue KB,  2 10    Slide lunge with block  1 6 With 5\" block   TKE  4pl 5\" 20    SLS with toss airex 1 20 With TKE/hecostick/partner   Standing heel raise       Bosu lunge with ST Fwd 5\" 10 Fwd   QS, SLR, SL hip abd,  h/s curl, LAQ, Hip ext, step up 8\", slide lunge and HR W/BFR 1 20 10#   Resisted Step up 12\" 2 10 Cues for slow ecc phase as able   Ecc leg press 130# 2 10    Manual Intervention (90006) 13  TIME     STM posterior knee, quad and hamstring  10     Patella mob  3                                 NMR re-education (79516) 10 resistance Sets/time Reps CUES NEEDED   TKE with NMES 15 min BFR 80% 8'  Blue cuff, performed with above exercises                                                    Therapeutic Activity (35446)  Sets/time                                                 Modalities:    Vasopneumatic Compression (Game Ready): Applied to Knee Left for significant edema, swelling, pain control for 15 minutes. Relevant ICD-10 R22.4 Localized swelling of lower limb    Education/Home Exercise Program: [id number] The [employer] code. Reviewed diagnosis, POC, HEP and its importance. ASSESSMENT     Today's Assessment: Patient is making great gains with therapy to date. He has some restrictions in end ROM extension due to medial hamstring stiffness/tightness. Noted that patient could tolerate more exercise before extreme fatigue today. Medical Necessity Documentation:  I certify that this patient meets the below criteria necessary for medical necessity for care and/or justification of therapy services: The patient has a musculoskeletal condition(s) with a corresponding ICD-10 code that is of complexity and severity that require skilled therapeutic intervention.

## 2023-10-25 ENCOUNTER — HOSPITAL ENCOUNTER (OUTPATIENT)
Dept: PHYSICAL THERAPY | Age: 23
Setting detail: THERAPIES SERIES
Discharge: HOME | End: 2023-10-25
Payer: COMMERCIAL

## 2023-10-25 PROCEDURE — 97140 MANUAL THERAPY 1/> REGIONS: CPT

## 2023-10-25 PROCEDURE — 97110 THERAPEUTIC EXERCISES: CPT

## 2023-10-25 NOTE — FLOWSHEET NOTE
[address] [place] [address] and Therapy [address] [place] [address] office: [phone] fax: [phone]        Physical Therapy Re-Certification Plan of Care/MD UPDATE      Physical Therapy: TREATMENT/PROGRESS NOTE   Patient: Pérez Jin (59 y.o. male)   Treatment Date: 10/25/2023   :  2000 MRN: [id number]   Visit #: 12   Insurance: Payor: Vidhi Carrier / Plan: [employer] / Product Type: *No Product type* /   Insurance ID: [id number] - ([facility])  Secondary Insurance (if applicable): A [employer]   Treatment Diagnosis:    No diagnosis found. 1. Left knee pain, unspecified chronicity  M25.562         2. Effusion of left knee  M25.462         3. Gait difficulty  R26.9       Medical Diagnosis:      Complete tear of anterior cruciate ligament of left knee, initial encounter [R88.180T]   Referring Physician: Santi Mehta MD  PCP: No primary care provider on file. Plan of care signed (Y/N): yes    Date of Patient follow up with Physician: wk of 10/1     Progress Report/POC: NO  PN due 10/13    POC update due (10 Rx/or 30 days whichever is less [facility]): POC due        Visit # Insurance Allowable Auth Needed    [facility]. []Yes    []No     Latex Allergy:  [x]NO      []YES  Preferred Language for Healthcare:   [x]English       []other:    SUBJECTIVE EXAMINATION     Patient Report/Comments: Patient states left knee is feeling really good, eager to begin running.         OBJECTIVE EXAMINATION     Observation:     Test measurements:      Test used Initial score 10/25/2023   Pain Summary VAS 4-5/10    Functional questionnaire LEFS 90% deficit 38%   ROM        See eval 0-140               Strength  See eval 5/5 quad      5/5 ham                 RESTRICTIONS/PRECAUTIONS: TROM use to avoid strain on MCL,  TTWB to start, progress ROM protecting MCL , progress ROM as tolerated while protecting MCL  reviewed figure 4 to avoid

## 2023-10-27 ENCOUNTER — HOSPITAL ENCOUNTER (OUTPATIENT)
Dept: PHYSICAL THERAPY | Age: 23
Setting detail: THERAPIES SERIES
Discharge: HOME | End: 2023-10-27
Payer: COMMERCIAL

## 2023-10-27 PROCEDURE — 97140 MANUAL THERAPY 1/> REGIONS: CPT

## 2023-10-27 PROCEDURE — 97110 THERAPEUTIC EXERCISES: CPT

## 2023-10-27 PROCEDURE — 97112 NEUROMUSCULAR REEDUCATION: CPT

## 2023-10-27 NOTE — FLOWSHEET NOTE
[address] [place] [address] and Therapy [address] [place] [address] office: [phone] fax: [phone]        Physical Therapy Re-Certification Plan of Care/MD UPDATE      Physical Therapy: TREATMENT/PROGRESS NOTE   Patient: Bruce Vega (11 y.o. male)   Treatment Date: 10/27/2023   :  2000 MRN: [id number]   Visit #: 12   Insurance: Payor: Mica Gonzalez / Plan: Melina Bamberg / Product Type: *No Product type* /   Insurance ID: [id number] - ([facility])  Secondary Insurance (if applicable): [employer]   Treatment Diagnosis:    No diagnosis found. 1. Left knee pain, unspecified chronicity  M25.562         2. Effusion of left knee  M25.462         3. Gait difficulty  R26.9       Medical Diagnosis:      Complete tear of anterior cruciate ligament of left knee, initial encounter [R25.328A]   Referring Physician: Neil Celestin MD  PCP: No primary care provider on file. Plan of care signed (Y/N): yes    Date of Patient follow up with Physician: wk of 10/1     Progress Report/POC: NO  PN due 10/13    POC update due (10 Rx/or 30 days whichever is less [address] [facility] [place]): POC due        Visit # New [place] []Yes    []No     Latex Allergy:  [x]NO      []YES  Preferred Language for Healthcare:   [x]English       []other:    SUBJECTIVE EXAMINATION     Patient Report/Comments: Patient reports left knee is feeling good. No increased soreness that lasted after wednesday.         OBJECTIVE EXAMINATION     Observation:     Test measurements:      Test used Initial score 10/27/2023   Pain Summary VAS 4-5/10    Functional questionnaire LEFS 90% deficit 38%   ROM        See eval 0-140               Strength  See eval 5/5 quad      5/5 ham                 RESTRICTIONS/PRECAUTIONS: TROM use to avoid strain on MCL,  TTWB to start, progress ROM protecting MCL , progress ROM as tolerated while protecting MCL  reviewed

## 2023-10-30 ENCOUNTER — HOSPITAL ENCOUNTER (OUTPATIENT)
Dept: PHYSICAL THERAPY | Age: 23
Setting detail: THERAPIES SERIES
Discharge: HOME | End: 2023-10-30
Payer: COMMERCIAL

## 2023-10-30 PROCEDURE — 97140 MANUAL THERAPY 1/> REGIONS: CPT

## 2023-10-30 PROCEDURE — 97110 THERAPEUTIC EXERCISES: CPT

## 2023-10-30 NOTE — FLOWSHEET NOTE
[address] [facility] and Therapy [address] [place] [address] office: [phone] fax: [phone]        Physical Therapy Re-Certification Plan of Care/MD UPDATE      Physical Therapy: TREATMENT/PROGRESS NOTE   Patient: Delfino Hunter (89 y.o. male)   Treatment Date: 10/30/2023   :  2000 MRN: [id number]   Visit #: 12   Insurance: Payor: Sarah Cover / Plan: Dortadeo Labella / Product Type: *No Product type* /   Insurance ID: [id number] - ([facility])  Secondary Insurance (if applicable): [employer]   Treatment Diagnosis:    No diagnosis found. 1. Left knee pain, unspecified chronicity  M25.562         2. Effusion of left knee  M25.462         3. Gait difficulty  R26.9       Medical Diagnosis:      Complete tear of anterior cruciate ligament of left knee, initial encounter [F11.082V]   Referring Physician: Jolly Sepulveda MD  PCP: No primary care provider on file. Plan of care signed (Y/N): yes    Date of Patient follow up with Physician: wk of 10/1     Progress Report/POC: NO  PN due 10/13    POC update due (10 Rx/or 30 days whichever is less [facility]): POC due        Visit # Insurance Allowable Auth Needed   [address] []Yes    []No     Latex Allergy:  [x]NO      []YES  Preferred Language for Healthcare:   [x]English       []other:    SUBJECTIVE EXAMINATION     Patient Report/Comments: Patient reports continued improvement in left knee.          OBJECTIVE EXAMINATION     Observation:     Test measurements:      Test used Initial score 10/30/2023   Pain Summary VAS 4-5/10    Functional questionnaire LEFS 90% deficit 38%   ROM        See eval 0-140               Strength  See eval 5/5 quad      5/5 ham                 RESTRICTIONS/PRECAUTIONS: TROM use to avoid strain on MCL,  TTWB to start, progress ROM protecting MCL , progress ROM as tolerated while protecting MCL  reviewed figure 4 to avoid strain on MCL also

## 2023-11-01 ENCOUNTER — HOSPITAL ENCOUNTER (OUTPATIENT)
Dept: PHYSICAL THERAPY | Age: 23
Setting detail: THERAPIES SERIES
Discharge: HOME | End: 2023-11-01
Payer: COMMERCIAL

## 2023-11-01 PROCEDURE — 97112 NEUROMUSCULAR REEDUCATION: CPT

## 2023-11-01 PROCEDURE — 97110 THERAPEUTIC EXERCISES: CPT

## 2023-11-01 PROCEDURE — 97140 MANUAL THERAPY 1/> REGIONS: CPT

## 2023-11-01 NOTE — FLOWSHEET NOTE
increase flexibility, following either an injury or surgery. (91767) NEUROMUSCULAR RE-EDUCATION- Therapeutic procedure, 1 or more areas, each 15 minutes; neuromuscular reeducation of movement, balance, coordination, kinesthetic sense, posture, and/or proprioception for sitting and/or standing activities  (80432) MANUAL THERAPY-  Manual therapy techniques, 1 or more regions, each 15 minutes (Mobilization/manipulation, manual lymphatic drainage, manual traction) for the purpose of modulating pain, promoting relaxation,  increasing ROM, reducing/eliminating soft tissue swelling/inflammation/restriction, improving soft tissue extensibility and allowing for proper ROM for normal function with self care, mobility, lifting and ambulation  (29066) VASOPNEUMATIC    TREATMENT PLAN   Plan: Cont POC- Continue emphasis/focus on exercise progression, increasing ROM, and improving soft tissue extensibility. Next visit plan to progress weights, progress reps, add new exercises, and progress balance     Electronically Signed by Vangie Garcia PT  Date: 11/01/2023     Note: If patient does not return for scheduled/recommended follow up visits, this note will serve as a discharge from care along with the most recent update on progress.

## 2023-11-03 ENCOUNTER — HOSPITAL ENCOUNTER (OUTPATIENT)
Dept: PHYSICAL THERAPY | Age: 23
Setting detail: THERAPIES SERIES
Discharge: HOME | End: 2023-11-03
Payer: COMMERCIAL

## 2023-11-03 PROCEDURE — 97112 NEUROMUSCULAR REEDUCATION: CPT

## 2023-11-03 PROCEDURE — 97140 MANUAL THERAPY 1/> REGIONS: CPT

## 2023-11-03 PROCEDURE — 97110 THERAPEUTIC EXERCISES: CPT

## 2023-11-06 ENCOUNTER — HOSPITAL ENCOUNTER (OUTPATIENT)
Dept: PHYSICAL THERAPY | Age: 23
Setting detail: THERAPIES SERIES
Discharge: HOME | End: 2023-11-06
Payer: COMMERCIAL

## 2023-11-06 PROCEDURE — 97140 MANUAL THERAPY 1/> REGIONS: CPT

## 2023-11-06 PROCEDURE — 97110 THERAPEUTIC EXERCISES: CPT

## 2023-11-06 NOTE — FLOWSHEET NOTE
8515 [facility] and Therapy [address] [place] [address] office: [phone] fax: [phone]        Physical Therapy Re-Certification Plan of Care/MD UPDATE      Physical Therapy: TREATMENT/PROGRESS NOTE   Patient: Mlaly Lopez (27 y.o. male)   Treatment Date: 2023   :  2000 MRN: [id number]   Visit #: 20   Insurance: Payor: Herbert Blandon / Plan: Aissatou Sharma / Product Type: *No Product type* /   Insurance ID: [id number] - ([facility])  Secondary Insurance (if applicable): [employer]   Treatment Diagnosis:    No diagnosis found. 1. Left knee pain, unspecified chronicity  M25.562         2. Effusion of left knee  M25.462         3. Gait difficulty  R26.9       Medical Diagnosis:      Complete tear of anterior cruciate ligament of left knee, initial encounter [A95.897P]   Referring Physician: Santos Martinez MD  PCP: No primary care provider on file. Plan of care signed (Y/N): yes    Date of Patient follow up with Physician: wk of 10/1     Progress Report/POC: NO  PN due 10/13    POC update due (10 Rx/or 30 days whichever is less [address]): POC due        Visit # Insurance Allowable Auth Needed   [address] [place] [address] []Yes    []No     Latex Allergy:  [x]NO      []YES  Preferred Language for Healthcare:   [x]English       []other:    SUBJECTIVE EXAMINATION     Patient Report/Comments: Patient states that right left knee is feeling better through posterior knee and hamstring.      OBJECTIVE EXAMINATION     Observation: noted decreased tenderness    Test measurements:      Test used Initial score 2023   Pain Summary VAS 4-5/10    Functional questionnaire LEFS 90% deficit 38%   ROM        See eval 0-140               Strength  See eval 5/5 quad      5/5 ham                 RESTRICTIONS/PRECAUTIONS: TROM use to avoid strain on MCL,  TTWB to start, progress ROM protecting MCL , progress ROM as tolerated while

## 2023-11-08 ENCOUNTER — HOSPITAL ENCOUNTER (OUTPATIENT)
Dept: PHYSICAL THERAPY | Age: 23
Setting detail: THERAPIES SERIES
Discharge: HOME | End: 2023-11-08
Payer: COMMERCIAL

## 2023-11-08 PROCEDURE — 97110 THERAPEUTIC EXERCISES: CPT

## 2023-11-08 PROCEDURE — 97140 MANUAL THERAPY 1/> REGIONS: CPT

## 2023-11-08 NOTE — FLOWSHEET NOTE
[address] [facility] and Therapy [address] [place] [address] [address] office: [phone] fax: [phone]        Physical Therapy Re-Certification Plan of Care/MD UPDATE      Physical Therapy: TREATMENT/PROGRESS NOTE   Patient: Deyanira Hicks (59 y.o. male)   Treatment Date: 2023   :  2000 MRN: [id number]   Visit #: 20   Insurance: Payor: Cass Single / Plan: Bradley Canal / Product Type: *No Product type* /   Insurance ID: [id number] - ([facility])  Secondary Insurance (if applicable): [employer]   Treatment Diagnosis:    No diagnosis found. 1. Left knee pain, unspecified chronicity  M25.562         2. Effusion of left knee  M25.462         3. Gait difficulty  R26.9       Medical Diagnosis:      Complete tear of anterior cruciate ligament of left knee, initial encounter [B28.019R]   Referring Physician: Sheyla Mccray MD  PCP: No primary care provider on file. Plan of care signed (Y/N): yes    Date of Patient follow up with Physician: wk of 10/1     Progress Report/POC: NO  PN due 10/13    POC update due (10 Rx/or 30 days whichever is less [facility]): POC due        Visit # [id number] []Yes    []No     Latex Allergy:  [x]NO      []YES  Preferred Language for Healthcare:   [x]English       []other:    SUBJECTIVE EXAMINATION     Patient Report/Comments: Ester Santiago reports increased soreness in patellar tendon today. Feels that he has been sore since Monday.      OBJECTIVE EXAMINATION     Observation: noted decreased tenderness    Test measurements:      Test used Initial score 2023   Pain Summary VAS 4-5/10    Functional questionnaire LEFS 90% deficit 38%   ROM        See eval 0-140               Strength  See eval 5/5 quad      5/5 ham                 RESTRICTIONS/PRECAUTIONS: TROM use to avoid strain on MCL,  TTWB to start, progress ROM protecting MCL , progress ROM as tolerated

## 2023-11-10 ENCOUNTER — HOSPITAL ENCOUNTER (OUTPATIENT)
Dept: PHYSICAL THERAPY | Age: 23
Setting detail: THERAPIES SERIES
Discharge: HOME | End: 2023-11-10
Payer: COMMERCIAL

## 2023-11-10 PROCEDURE — 97140 MANUAL THERAPY 1/> REGIONS: CPT

## 2023-11-10 PROCEDURE — 97110 THERAPEUTIC EXERCISES: CPT

## 2023-11-10 NOTE — FLOWSHEET NOTE
without increased symptoms or restriction to work towards return to prior level of function. Status: [x] Progressing: [] Met: [] Not Met: [] Adjusted  Patient will increase LE function to allow independence in all self-care activities. Status: [] Progressing: [x] Met: [] Not Met: [] Adjusted    Overall Progression Towards Functional goals/ Treatment Progress Update:  [x] Patient is progressing as expected towards functional goals listed. [] Progression is slowed due to complexities/Impairments listed. [] Progression has been slowed due to co-morbidities. [] Plan just implemented, too soon (<30days) to assess goals progression   [] Goals require adjustment due to lack of progress  [] Patient is not progressing as expected and requires additional follow up with physician  [] Other:     CHARGE CAPTURE     CPT Code (TIMED) minutes # CPT Code (UNTIMED) #     [x] Therex (77973)  30 2  [] EVAL:LOW (55324 - Typically 20 minutes face-to-face)     [] Neuromusc. Re-ed (44801)    [] Re-Eval (79632)     [x] Manual (86902) 17 1  [] Estim Unattended (68606)     [x] Ther. Act (53788) 5   [] Mech. Traction (56521)     [] Gait (54533)    [] Dry Needle 1-2 muscle (05541)     [] Aquatic Therex (04752)    [] Dry Needle 3+ muscle (74721)     [] Iontophoresis (21276)    [] VASO (93353)     [] Ultrasound (67922)    [] Group Therapy (14517)     [] Estim Attended (57257)    [] Other: Total Timed Code Tx Minutes 52        Total Treatment Minutes 52        Charge Justification:  (00406) THERAPEUTIC EXERCISE - Provided verbal/tactile cueing for activities related to strengthening, flexibility, endurance, ROM performed to prevent loss of range of motion, maintain or improve muscular strength or increase flexibility, following either an injury or surgery.    (01678) [facility]

## 2023-11-13 ENCOUNTER — HOSPITAL ENCOUNTER (OUTPATIENT)
Dept: PHYSICAL THERAPY | Age: 23
Setting detail: THERAPIES SERIES
Discharge: HOME | End: 2023-11-13
Payer: COMMERCIAL

## 2023-11-13 PROCEDURE — 97140 MANUAL THERAPY 1/> REGIONS: CPT

## 2023-11-13 PROCEDURE — 97110 THERAPEUTIC EXERCISES: CPT

## 2023-11-13 NOTE — FLOWSHEET NOTE
[address] [place] [address] and Therapy [address] [place] [address] office: [phone] fax: [phone]        Physical Therapy Re-Certification Plan of Care/MD UPDATE      Physical Therapy: TREATMENT/PROGRESS NOTE   Patient: Pat Colón (89 y.o. male)   Treatment Date: 2023   :  2000 MRN: [id number]   Visit #: 20   Insurance: Payor: Gabbie Petersen / Plan: Keri Hicks / Product Type: *No Product type* /   Insurance ID: [id number] - ([facility])  Secondary Insurance (if applicable): A [employer]   Treatment Diagnosis:    No diagnosis found. 1. Left knee pain, unspecified chronicity  M25.562         2. Effusion of left knee  M25.462         3. Gait difficulty  R26.9       Medical Diagnosis:      Complete tear of anterior cruciate ligament of left knee, initial encounter [Z04.275U]   Referring Physician: Natalio Fulton MD  PCP: No primary care provider on file. Plan of care signed (Y/N): yes    Date of Patient follow up with Physician: wk of 10/1     Progress Report/POC: NO  PN due 10/13    POC update due (10 Rx/or 30 days whichever is less [facility]): POC due        Visit # [address] []Yes    []No     Latex Allergy:  [x]NO      []YES  Preferred Language for Healthcare:   [x]English       []other:    SUBJECTIVE EXAMINATION     Patient Report/Comments: Kinjal Guerra reports that his patellar tendon is feeling better today. Chief complaint is soreness in posterior knee and calf.      OBJECTIVE EXAMINATION     Observation: noted decreased tenderness    Test measurements:      Test used Initial score 2023   Pain Summary VAS 4-5/10    Functional questionnaire LEFS 90% deficit 38%   ROM        See eval 0-140               Strength  See eval 5/5 quad      5/5 ham                 RESTRICTIONS/PRECAUTIONS: TROM use to avoid strain on MCL,  TTWB to start, progress ROM protecting MCL , progress

## 2023-11-15 ENCOUNTER — HOSPITAL ENCOUNTER (OUTPATIENT)
Dept: PHYSICAL THERAPY | Age: 23
Setting detail: THERAPIES SERIES
Discharge: HOME | End: 2023-11-15
Payer: COMMERCIAL

## 2023-11-15 PROCEDURE — 97110 THERAPEUTIC EXERCISES: CPT

## 2023-11-15 PROCEDURE — 97140 MANUAL THERAPY 1/> REGIONS: CPT

## 2023-11-15 NOTE — FLOWSHEET NOTE
strength or increase flexibility, following either an injury or surgery. (46544) NEUROMUSCULAR RE-EDUCATION- Therapeutic procedure, 1 or more areas, each 15 minutes; neuromuscular reeducation of movement, balance, coordination, kinesthetic sense, posture, and/or proprioception for sitting and/or standing activities  (78622) MANUAL THERAPY-  Manual therapy techniques, 1 or more regions, each 15 minutes (Mobilization/manipulation, manual lymphatic drainage, manual traction) for the purpose of modulating pain, promoting relaxation,  increasing ROM, reducing/eliminating soft tissue swelling/inflammation/restriction, improving soft tissue extensibility and allowing for proper ROM for normal function with self care, mobility, lifting and ambulation  (40544) VASOPNEUMATIC    TREATMENT PLAN   Plan: Cont POC- Continue emphasis/focus on exercise progression, increasing ROM, and improving soft tissue extensibility. Next visit plan to progress weights, progress reps, add new exercises, and progress balance     Electronically Signed by Archana Marshall PT  Date: 11/15/2023     Note: If patient does not return for scheduled/recommended follow up visits, this note will serve as a discharge from care along with the most recent update on progress.

## 2023-11-17 ENCOUNTER — HOSPITAL ENCOUNTER (OUTPATIENT)
Dept: PHYSICAL THERAPY | Age: 23
Setting detail: THERAPIES SERIES
Discharge: HOME | End: 2023-11-17
Payer: COMMERCIAL

## 2023-11-17 PROCEDURE — 97110 THERAPEUTIC EXERCISES: CPT

## 2023-11-17 PROCEDURE — 97530 THERAPEUTIC ACTIVITIES: CPT

## 2023-11-17 PROCEDURE — 97140 MANUAL THERAPY 1/> REGIONS: CPT

## 2023-11-17 NOTE — FLOWSHEET NOTE
[address] [place] [address] and Therapy [address] [place] [address] office: [phone] fax: [phone]        Physical Therapy Re-Certification Plan of Care/MD UPDATE      Physical Therapy: TREATMENT/PROGRESS NOTE   Patient: Erik Gomes (70 y.o. male)   Treatment Date: 2023   :  2000 MRN: [id number]   Visit #: 20   Insurance: Payor: Dipak Carolina / Plan: Ezekiel Pruett / Product Type: *No Product type* /   Insurance ID: [id number] - ([facility])  Secondary Insurance (if applicable): A [employer]   Treatment Diagnosis:    No diagnosis found. 1. Left knee pain, unspecified chronicity  M25.562         2. Effusion of left knee  M25.462         3. Gait difficulty  R26.9       Medical Diagnosis:      Complete tear of anterior cruciate ligament of left knee, initial encounter [N21.134W]   Referring Physician: Ranjith Mendoza MD  PCP: No primary care provider on file. Plan of care signed (Y/N): yes    Date of Patient follow up with Physician: wk of 10/1     Progress Report/POC: NO  PN due 10/13    POC update due (10 Rx/or 30 days whichever is less [address] [place] [address]): POC due        Visit # 133 [facility]   [place] []Yes    []No     Latex Allergy:  [x]NO      []YES  Preferred Language for Healthcare:   [x]English       []other:    SUBJECTIVE EXAMINATION     Patient Report/Comments: Patient states that his left knee is feeling much better today. No posterior knee pain.      OBJECTIVE EXAMINATION     Observation: tightness through lateral hamstring    Test measurements:      Test used Initial score 2023   Pain Summary VAS 4-5/10    Functional questionnaire LEFS 90% deficit 38%   ROM        See eval 0-140               Strength  See eval 5/5 quad      5/5 ham                 RESTRICTIONS/PRECAUTIONS:     Exercises/Interventions:     Therapeutic Ex (46568) 30 resistance Sets/time Reps Notes/Cues/Progressions

## 2023-11-20 ENCOUNTER — HOSPITAL ENCOUNTER (OUTPATIENT)
Dept: PHYSICAL THERAPY | Age: 23
Setting detail: THERAPIES SERIES
Discharge: HOME | End: 2023-11-20
Payer: COMMERCIAL

## 2023-11-20 PROCEDURE — 97110 THERAPEUTIC EXERCISES: CPT

## 2023-11-20 PROCEDURE — 97140 MANUAL THERAPY 1/> REGIONS: CPT

## 2023-11-20 NOTE — PLAN OF CARE
surgery. (67652) NEUROMUSCULAR RE-EDUCATION- Therapeutic procedure, 1 or more areas, each 15 minutes; neuromuscular reeducation of movement, balance, coordination, kinesthetic sense, posture, and/or proprioception for sitting and/or standing activities  (67670) MANUAL THERAPY-  Manual therapy techniques, 1 or more regions, each 15 minutes (Mobilization/manipulation, manual lymphatic drainage, manual traction) for the purpose of modulating pain, promoting relaxation,  increasing ROM, reducing/eliminating soft tissue swelling/inflammation/restriction, improving soft tissue extensibility and allowing for proper ROM for normal function with self care, mobility, lifting and ambulation  (88187) VASOPNEUMATIC    TREATMENT PLAN   Plan: Cont POC- Continue emphasis/focus on exercise progression, increasing ROM, and improving soft tissue extensibility. Next visit plan to progress weights, progress reps, add new exercises, and progress balance     Electronically Signed by Yamel Narvaez, CHAITANYA  Date: 11/20/2023     Note: If patient does not return for scheduled/recommended follow up visits, this note will serve as a discharge from care along with the most recent update on progress.

## 2023-11-22 ENCOUNTER — HOSPITAL ENCOUNTER (OUTPATIENT)
Dept: PHYSICAL THERAPY | Age: 23
Setting detail: THERAPIES SERIES
Discharge: HOME | End: 2023-11-22
Payer: COMMERCIAL

## 2023-11-22 PROCEDURE — 97110 THERAPEUTIC EXERCISES: CPT

## 2023-11-22 PROCEDURE — 97530 THERAPEUTIC ACTIVITIES: CPT

## 2023-11-22 NOTE — FLOWSHEET NOTE
[address] [place] [address] and Therapy [address] [place] [address] office: [phone] fax: [phone]              Physical Therapy: TREATMENT/PROGRESS NOTE   Patient: Leroy Da Silva (65 y.o. male)   Treatment Date: 2023   :  2000 MRN: [id number]   Visit #: 20   Insurance: Payor: Alin Wynn / Plan: Bharathi Constable / Product Type: *No Product type* /   Insurance ID: [id number] - ([facility])  Secondary Insurance (if applicable): JOSEPH VAUGHAN   Treatment Diagnosis:    No diagnosis found. 1. Left knee pain, unspecified chronicity  M25.562         2. Effusion of left knee  M25.462         3. Gait difficulty  R26.9       Medical Diagnosis:      Complete tear of anterior cruciate ligament of left knee, initial encounter [Z23.626Z]   Referring Physician: Armida Nolan MD  PCP: No primary care provider on file. Plan of care signed (Y/N): yes    Date of Patient follow up with Physician: wk of 10/1     Progress Report/POC: NO      POC update due (10 Rx/or 30 days whichever is less [address]): POC due        Visit # [address] []Yes    []No     Latex Allergy:  [x]NO      []YES  Preferred Language for Healthcare:   [x]English       []other:    SUBJECTIVE EXAMINATION     Patient Report/Comments: Patient reports that his knee is doing well. No complaints of pain entering PT today.    OBJECTIVE EXAMINATION     Observation: tightness through lateral hamstring    Test measurements: 73.5, 73.7     Test used Initial score 2023   Pain Summary VAS 4-5/10    Functional questionnaire LEFS 90% deficit 38%   ROM        See eval 0-140               Strength  See eval 5/5 quad      5/5 ham                 RESTRICTIONS/PRECAUTIONS:     Exercises/Interventions:     Therapeutic Ex (38881) 30 resistance Sets/time Reps Notes/Cues/Progressions   bike  5'     Lateral side step blue 15' 2    SL RDL 2 black

## 2023-11-27 ENCOUNTER — HOSPITAL ENCOUNTER (OUTPATIENT)
Dept: PHYSICAL THERAPY | Age: 23
Setting detail: THERAPIES SERIES
Discharge: HOME | End: 2023-11-27
Payer: COMMERCIAL

## 2023-11-27 PROCEDURE — 97110 THERAPEUTIC EXERCISES: CPT

## 2023-11-27 PROCEDURE — 97530 THERAPEUTIC ACTIVITIES: CPT

## 2023-11-27 PROCEDURE — 97140 MANUAL THERAPY 1/> REGIONS: CPT

## 2023-11-27 NOTE — FLOWSHEET NOTE
8515 [facility] and Therapy [address] [place] [address] office: [phone] fax: [phone]              Physical Therapy: TREATMENT/PROGRESS NOTE   Patient: Erik Gomes (49 y.o. male)   Treatment Date: 2023   :  2000 MRN: [id number]   Visit #: 20   Insurance: Payor: Dipak Carolina / Plan: Ezekiel Pruett / Product Type: *No Product type* /   Insurance ID: [id number] - ([facility])  Secondary Insurance (if applicable): JOSEPH VAUGHAN   Treatment Diagnosis:    No diagnosis found. 1. Left knee pain, unspecified chronicity  M25.562         2. Effusion of left knee  M25.462         3. Gait difficulty  R26.9       Medical Diagnosis:      Complete tear of anterior cruciate ligament of left knee, initial encounter [C83.803O]   Referring Physician: Ranjith Mendoza MD  PCP: No primary care provider on file. Plan of care signed (Y/N): yes    Date of Patient follow up with Physician: wk of 10/1     Progress Report/POC: NO      POC update due (10 Rx/or 30 days whichever is less [facility]): POC due        Visit # [address] []Yes    []No     Latex Allergy:  [x]NO      []YES  Preferred Language for Healthcare:   [x]English       []other:    SUBJECTIVE EXAMINATION     Patient Report/Comments: Patient reports that his knee is doing well. No complaints of pain entering PT today.    OBJECTIVE EXAMINATION     Observation: tightness through lateral hamstring    Test measurements:      Test used Initial score 2023   Pain Summary VAS 4-5/10    Functional questionnaire LEFS 90% deficit 38%   ROM        See eval 0-140               Strength  See eval 5/5 quad      5/5 ham                 RESTRICTIONS/PRECAUTIONS:     Exercises/Interventions:     Therapeutic Ex (27214) 30 resistance Sets/time Reps Notes/Cues/Progressions   bike  5'     Lateral side step blue 15' 2    SL RDL 2 black KB,  2 10

## 2023-11-29 ENCOUNTER — HOSPITAL ENCOUNTER (OUTPATIENT)
Dept: PHYSICAL THERAPY | Age: 23
Setting detail: THERAPIES SERIES
Discharge: HOME | End: 2023-11-29
Payer: COMMERCIAL

## 2023-11-29 PROCEDURE — 97110 THERAPEUTIC EXERCISES: CPT

## 2023-11-29 PROCEDURE — 97140 MANUAL THERAPY 1/> REGIONS: CPT

## 2023-11-29 PROCEDURE — 97530 THERAPEUTIC ACTIVITIES: CPT

## 2023-11-29 PROCEDURE — 97112 NEUROMUSCULAR REEDUCATION: CPT

## 2023-11-29 NOTE — FLOWSHEET NOTE
Adjusted    Overall Progression Towards Functional goals/ Treatment Progress Update:  [x] Patient is progressing as expected towards functional goals listed. [] Progression is slowed due to complexities/Impairments listed. [] Progression has been slowed due to co-morbidities. [] Plan just implemented, too soon (<30days) to assess goals progression   [] Goals require adjustment due to lack of progress  [] Patient is not progressing as expected and requires additional follow up with physician  [] Other:     CHARGE CAPTURE     CPT Code (TIMED) minutes # CPT Code (UNTIMED) #     [x] Therex (28219)  30 2  [] EVAL:LOW (15051 - Typically 20 minutes face-to-face)     [] Neuromusc. Re-ed (39482)    [] Re-Eval (05204)     [x] Manual (50393) 17 1  [] Estim Unattended (73494)     [x] Ther. Act (87963) 16 1  [] [facility]. Traction (90107)     [] Gait (68904)    [] Dry Needle 1-2 muscle (89445)     [] Aquatic Therex (17916)    [] Dry Needle 3+ muscle (88830)     [] Iontophoresis (46637)    [] VASO (36423)     [] Ultrasound (94591)    [] Group Therapy (39295)     [] Estim Attended (96473)    [] Other: Total Timed Code Tx Minutes 52        Total Treatment Minutes 52        Charge Justification:  (99074) THERAPEUTIC EXERCISE - Provided verbal/tactile cueing for activities related to strengthening, flexibility, endurance, ROM performed to prevent loss of range of motion, maintain or improve muscular strength or increase flexibility, following either an injury or surgery. (74550) [facility]- Reviewed/Progressed HEP activities related to strengthening, flexibility, endurance, ROM performed to prevent loss of range of motion, maintain or improve muscular strength or increase flexibility, following either an injury or surgery.   (62262) NEUROMUSCULAR RE-EDUCATION- Therapeutic procedure, 1 or more areas, each 15 minutes; neuromuscular reeducation of movement, balance, coordination, kinesthetic sense, posture, and/or

## 2023-12-01 ENCOUNTER — HOSPITAL ENCOUNTER (OUTPATIENT)
Dept: PHYSICAL THERAPY | Age: 23
Setting detail: THERAPIES SERIES
Discharge: HOME | End: 2023-12-01
Payer: COMMERCIAL

## 2023-12-01 PROCEDURE — 97140 MANUAL THERAPY 1/> REGIONS: CPT

## 2023-12-01 PROCEDURE — 97110 THERAPEUTIC EXERCISES: CPT

## 2023-12-01 NOTE — FLOWSHEET NOTE
[address] [place] [address] and Therapy [address] [place] [address] office: [phone] fax: [phone]              Physical Therapy: TREATMENT/PROGRESS NOTE   Patient: Ann Swanson (56 y.o. male)   Treatment Date: 2023   :  2000 MRN: [id number]   Visit #: 20   Insurance: Payor: Eugenia Iqbal / Plan: Ruth Woodland / Product Type: *No Product type* /   Insurance ID: [id number] - ([facility])  Secondary Insurance (if applicable): JOSEPH VAUGHAN   Treatment Diagnosis:    No diagnosis found. 1. Left knee pain, unspecified chronicity  M25.562         2. Effusion of left knee  M25.462         3. Gait difficulty  R26.9       Medical Diagnosis:      Complete tear of anterior cruciate ligament of left knee, initial encounter [U54.076V]   Referring Physician: Pierre Walker MD  PCP: No primary care provider on file. Plan of care signed (Y/N): yes    Date of Patient follow up with Physician: wk of 10/1     Progress Report/POC: NO      POC update due (10 Rx/or 30 days whichever is less [address]): POC due        Visit # [id number] []Yes    []No     Latex Allergy:  [x]NO      []YES  Preferred Language for Healthcare:   [x]English       []other:    SUBJECTIVE EXAMINATION     Patient Report/Comments: Patient states continued improvement in left knee. Feeling like strength is progressing.    OBJECTIVE EXAMINATION     Observation: tightness through lateral calf    Test measurements:      Test used Initial score 2023   Pain Summary VAS 4-5/10    Functional questionnaire LEFS 90% deficit 38%   ROM        See eval 0-140               Strength  See eval 5/5 quad      5/5 ham                 RESTRICTIONS/PRECAUTIONS:     Exercises/Interventions:     Therapeutic Ex (72244) 30 resistance Sets/time Reps Notes/Cues/Progressions   bike  5'     Lateral side step blue 15' 2 blue   SL RDL 2 black KB,  2

## 2023-12-04 ENCOUNTER — HOSPITAL ENCOUNTER (OUTPATIENT)
Dept: PHYSICAL THERAPY | Age: 23
Setting detail: THERAPIES SERIES
Discharge: HOME | End: 2023-12-04
Payer: COMMERCIAL

## 2023-12-04 PROCEDURE — 97110 THERAPEUTIC EXERCISES: CPT

## 2023-12-04 PROCEDURE — 97140 MANUAL THERAPY 1/> REGIONS: CPT

## 2023-12-04 NOTE — FLOWSHEET NOTE
8515 [facility] and Therapy [address] [place] [address] office: [phone] fax: [phone]              Physical Therapy: TREATMENT/PROGRESS NOTE   Patient: Bre Oseguera (24 y.o. male)   Treatment Date: 2023   :  2000 MRN: [id number]   Visit #: 20   Insurance: Payor: Geraldine Leggett / Plan: Jayne Buchanan / Product Type: *No Product type* /   Insurance ID: [id number] - ([facility])  Secondary Insurance (if applicable): A [employer]   Treatment Diagnosis:    No diagnosis found. 1. Left knee pain, unspecified chronicity  M25.562         2. Effusion of left knee  M25.462         3. Gait difficulty  R26.9       Medical Diagnosis:      Complete tear of anterior cruciate ligament of left knee, initial encounter [T77.023U]   Referring Physician: Jasvir Cowan MD  PCP: No primary care provider on file. Plan of care signed (Y/N): yes    Date of Patient follow up with Physician: wk of 10/1     Progress Report/POC: NO      POC update due (10 Rx/or 30 days whichever is less [address] [place] [address]): POC due        Visit # [address] []Yes    []No     Latex Allergy:  [x]NO      []YES  Preferred Language for Healthcare:   [x]English       []other:    SUBJECTIVE EXAMINATION     Patient Report/Comments: Patient reports feeling better through posterior knee, some quad soreness today. OBJECTIVE EXAMINATION     Observation: tightness through lateral quad proximal 1/3.      Test measurements:      Test used Initial score 2023   Pain Summary VAS 4-5/10    Functional questionnaire LEFS 90% deficit 38%   ROM        See eval 0-140               Strength  See eval 5/5 quad      5/5 ham                 RESTRICTIONS/PRECAUTIONS:     Exercises/Interventions:     Therapeutic Ex (75698) 30 resistance Sets/time Reps Notes/Cues/Progressions   bike  5'     Lateral side step blue 15' 2 blue   SL RDL 2 black

## 2023-12-06 ENCOUNTER — HOSPITAL ENCOUNTER (OUTPATIENT)
Dept: PHYSICAL THERAPY | Age: 23
Setting detail: THERAPIES SERIES
Discharge: HOME | End: 2023-12-06
Payer: COMMERCIAL

## 2023-12-06 PROCEDURE — 97140 MANUAL THERAPY 1/> REGIONS: CPT

## 2023-12-06 PROCEDURE — 97110 THERAPEUTIC EXERCISES: CPT

## 2023-12-06 NOTE — FLOWSHEET NOTE
5428 [facility] and Therapy [address] [place] [address] office: [phone] fax: [phone]              Physical Therapy: TREATMENT/PROGRESS NOTE   Patient: Arturo Arriaga (6 y.o. male)   Treatment Date: 2023   :  2000 MRN: [id number]   Visit #: 20   Insurance: Payor: Tomasz Santos / Plan: Jaqueline Wyatt / Product Type: *No Product type* /   Insurance ID: [id number] - ([facility])  Secondary Insurance (if applicable): A [employer]   Treatment Diagnosis:    No diagnosis found. 1. Left knee pain, unspecified chronicity  M25.562         2. Effusion of left knee  M25.462         3. Gait difficulty  R26.9       Medical Diagnosis:      Complete tear of anterior cruciate ligament of left knee, initial encounter [F38.962D]   Referring Physician: Epi Ashraf MD  PCP: No primary care provider on file. Plan of care signed (Y/N): yes    Date of Patient follow up with Physician: wk of 10/1     Progress Report/POC: NO      POC update due (10 Rx/or 30 days whichever is less [address] [place] [address]): POC due        Visit # [address] [facility] []Yes    []No     Latex Allergy:  [x]NO      []YES  Preferred Language for Healthcare:   [x]English       []other:    SUBJECTIVE EXAMINATION     Patient Report/Comments: Patient reports that his knee is doing well, no complaints of pain entering PT today. Mild soreness in lateral quad. OBJECTIVE EXAMINATION     Observation: tightness through lateral quad proximal 1/3.      Test measurements:      Test used Initial score 2023   Pain Summary VAS 4-5/10    Functional questionnaire LEFS 90% deficit 38%   ROM        See eval 0-140               Strength  See eval 5/5 quad      5/5 ham                 RESTRICTIONS/PRECAUTIONS:     Exercises/Interventions:     Therapeutic Ex (75420) 30 resistance Sets/time Reps Notes/Cues/Progressions   bike  5'     Lateral side

## 2023-12-08 ENCOUNTER — OFFICE VISIT (OUTPATIENT)
Dept: ORTHOPEDIC SURGERY | Age: 23
End: 2023-12-08

## 2023-12-08 ENCOUNTER — HOSPITAL ENCOUNTER (OUTPATIENT)
Dept: PHYSICAL THERAPY | Age: 23
Setting detail: THERAPIES SERIES
Discharge: HOME | End: 2023-12-08
Payer: COMMERCIAL

## 2023-12-08 DIAGNOSIS — Z98.890 S/P MCL REPAIR: ICD-10-CM

## 2023-12-08 DIAGNOSIS — Z98.890 S/P ACL RECONSTRUCTION: Primary | ICD-10-CM

## 2023-12-08 PROCEDURE — 99024 POSTOP FOLLOW-UP VISIT: CPT | Performed by: PHYSICIAN ASSISTANT

## 2023-12-08 PROCEDURE — 97140 MANUAL THERAPY 1/> REGIONS: CPT

## 2023-12-08 PROCEDURE — 97110 THERAPEUTIC EXERCISES: CPT

## 2023-12-08 NOTE — PROGRESS NOTES
Chief Complaint  Follow-up (Left knee. S/p acl reconstruction )      History of Present Illness:  Jayson Peterson is a pleasant 21 y.o. male just shy of 3 months status post left knee arthroscopy with ACL reconstruction with BTB autograft as well as MCL repair with partial lateral meniscectomy 9/12/2023. [employer] football player. Doing well with no concerns. Progressing through rehab protocol. Medical History:  Patient's medications, allergies, past medical, surgical, social and family histories were reviewed and updated as appropriate. ROS: Review of systems reviewed from Patient History Form completed today and available in the patient's chart under the Media tab. Pertinent items are noted in HPI  Review of systems reviewed from Patient History Form completed today and available in the patient's chart under the Media tab. Vital Signs: There were no vitals taken for this visit. Left knee examination:    Gait: No use of assistive devices. No antalgic gait. Alignment: normal alignment. Inspection/skin: Skin is intact without erythema or ecchymosis. No gross deformity. Healed incisions. Palpation: mild crepitus. no joint line tenderness present. Range of Motion: There is full range of motion. Strength: Normal quadriceps development. Effusion: No effusion or swelling present. Ligamentous stability: No cruciate or collateral ligament instability. Neurologic and vascular: Skin is warm and well-perfused. Sensation is intact to light-touch. Special tests: Negative Waylon sign. Radiology:       Pertinent imaging was interpreted and reviewed with the patient today, images only - no report available. No new imaging was obtained during today's visit. Assessment : 3  months status post left knee arthroscopy with ACL reconstruction with BTB autograft as well as MCL repair with partial lateral meniscectomy 9/12/2023.      Impression:  Encounter

## 2023-12-08 NOTE — FLOWSHEET NOTE
[address] [place] [address] and Therapy [address] [place] [address] office: [phone] fax: [phone]      Physical Therapy: TREATMENT/PROGRESS NOTE   Patient: Clarence Pappas (89 y.o. male)   Treatment Date: 2023   :  2000 MRN: [id number]   Visit #: 20   Insurance: Payor: Ibrahima Graft / Plan: Glenna Public / Product Type: *No Product type* /   Insurance ID: [id number] - ([facility])  Secondary Insurance (if applicable): JOSEPH VAUGHAN   Treatment Diagnosis:    No diagnosis found. 1. Left knee pain, unspecified chronicity  M25.562         2. Effusion of left knee  M25.462         3. Gait difficulty  R26.9       Medical Diagnosis:      Complete tear of anterior cruciate ligament of left knee, initial encounter [K26.878U]   Referring Physician: Damien Bowling MD  PCP: No primary care provider on file. Plan of care signed (Y/N): yes    Date of Patient follow up with Physician: wk of 10/1     Progress Report/POC: NO      POC update due (10 Rx/or 30 days whichever is less [address] [place] [address]): POC due        Visit # [place] []Yes    []No     Latex Allergy:  [x]NO      []YES  Preferred Language for Healthcare:   [x]English       []other:    SUBJECTIVE EXAMINATION     Patient Report/Comments: Patient reports that his knee is doing well, no complaints of pain entering PT today. Mild soreness in lateral quad. OBJECTIVE EXAMINATION     Observation: tightness through lateral quad proximal 1/3.      Test measurements:      Test used Initial score 2023   Pain Summary VAS 4-5/10    Functional questionnaire LEFS 90% deficit 38%   ROM        See eval 0-140               Strength  See eval 5/5 quad      5/5 ham                 RESTRICTIONS/PRECAUTIONS:     Exercises/Interventions:     Therapeutic Ex (83797) 30 resistance Sets/time Reps Notes/Cues/Progressions   bike  5'     Lateral side step blue

## 2023-12-11 ENCOUNTER — HOSPITAL ENCOUNTER (OUTPATIENT)
Dept: PHYSICAL THERAPY | Age: 23
Setting detail: THERAPIES SERIES
Discharge: HOME | End: 2023-12-11
Payer: COMMERCIAL

## 2023-12-11 PROCEDURE — 97530 THERAPEUTIC ACTIVITIES: CPT

## 2023-12-11 PROCEDURE — 97110 THERAPEUTIC EXERCISES: CPT

## 2023-12-11 PROCEDURE — 97140 MANUAL THERAPY 1/> REGIONS: CPT

## 2023-12-11 NOTE — FLOWSHEET NOTE
8515 [facility] and Therapy [address] [place] [address] office: [phone] fax: [phone]      Physical Therapy Re-Certification Plan of Care/MD UPDATE        Physical Therapy: TREATMENT/PROGRESS NOTE   Patient: Gumaro Lai (88 y.o. male)   Treatment Date: 2023   :  2000 MRN: [id number]   Visit #: 20   Insurance: Payor: Raymon De La O / Plan: Gerardo Lebron / Product Type: *No Product type* /   Insurance ID: [id number] - ([facility])  Secondary Insurance (if applicable): A [employer]   Treatment Diagnosis:    No diagnosis found. 1. Left knee pain, unspecified chronicity  M25.562         2. Effusion of left knee  M25.462         3. Gait difficulty  R26.9       Medical Diagnosis:      Complete tear of anterior cruciate ligament of left knee, initial encounter [H17.761N]   Referring Physician: Yvonne Mena MD  PCP: No primary care provider on file. Plan of care signed (Y/N): yes    Date of Patient follow up with Physician: wk of 10/1     Progress Report/POC: NO      POC update due (10 Rx/or 30 days whichever is less [facility] [facility]): POC due        Visit # [address] []Yes    []No     Latex Allergy:  [x]NO      []YES  Preferred Language for Healthcare:   [x]English       []other:    SUBJECTIVE EXAMINATION     Patient Report/Comments: Patient reports that his knee is doing well, no complaints of pain entering PT today. Mild soreness in lateral quad. OBJECTIVE EXAMINATION     Observation: tightness through lateral quad proximal 1/3.      Test measurements:      Test used Initial score 2023   Pain Summary VAS 4-5/10    Functional questionnaire LEFS 90% deficit 38%   ROM        See eval 0-145               Strength  See eval 5/5 quad      5/5 ham                 RESTRICTIONS/PRECAUTIONS:     Exercises/Interventions:     Therapeutic Ex (41032) 30 resistance Sets/time Reps

## 2023-12-27 ENCOUNTER — HOSPITAL ENCOUNTER (OUTPATIENT)
Dept: PHYSICAL THERAPY | Age: 23
Setting detail: THERAPIES SERIES
Discharge: HOME | End: 2023-12-27
Payer: COMMERCIAL

## 2023-12-27 PROCEDURE — 97110 THERAPEUTIC EXERCISES: CPT

## 2023-12-27 PROCEDURE — 97140 MANUAL THERAPY 1/> REGIONS: CPT

## 2023-12-27 PROCEDURE — 97530 THERAPEUTIC ACTIVITIES: CPT

## 2023-12-27 NOTE — FLOWSHEET NOTE
injury or surgery. (41805) NEUROMUSCULAR RE-EDUCATION- Therapeutic procedure, 1 or more areas, each 15 minutes; neuromuscular reeducation of movement, balance, coordination, kinesthetic sense, posture, and/or proprioception for sitting and/or standing activities  (04890) MANUAL THERAPY-  Manual therapy techniques, 1 or more regions, each 15 minutes (Mobilization/manipulation, manual lymphatic drainage, manual traction) for the purpose of modulating pain, promoting relaxation,  increasing ROM, reducing/eliminating soft tissue swelling/inflammation/restriction, improving soft tissue extensibility and allowing for proper ROM for normal function with self care, mobility, lifting and ambulation  (89416) VASOPNEUMATIC    TREATMENT PLAN   Plan: Cont POC- Continue emphasis/focus on exercise progression, increasing ROM, and improving soft tissue extensibility. Next visit plan to progress weights, progress reps, add new exercises, and progress balance     Electronically Signed by Rey Martin PTA  Date: 12/27/2023     Note: If patient does not return for scheduled/recommended follow up visits, this note will serve as a discharge from care along with the most recent update on progress.

## 2023-12-29 ENCOUNTER — HOSPITAL ENCOUNTER (OUTPATIENT)
Dept: PHYSICAL THERAPY | Age: 23
Setting detail: THERAPIES SERIES
Discharge: HOME | End: 2023-12-29
Payer: COMMERCIAL

## 2023-12-29 PROCEDURE — 97110 THERAPEUTIC EXERCISES: CPT

## 2023-12-29 PROCEDURE — 97530 THERAPEUTIC ACTIVITIES: CPT

## 2023-12-29 PROCEDURE — 97140 MANUAL THERAPY 1/> REGIONS: CPT

## 2023-12-29 NOTE — FLOWSHEET NOTE
8515 [facility] and Therapy [address] [place] [address] office: [phone] fax: [phone]      Physical Therapy Re-Certification Plan of Care/MD UPDATE        Physical Therapy: TREATMENT/PROGRESS NOTE   Patient: Lona Middleton (24 y.o. male)   Treatment Date: 2023   :  2000 MRN: [id number]   Visit #: 20   Insurance: Payor: Benjamin Feliciano / Plan: Mort Pap / Product Type: *No Product type* /   Insurance ID: [id number] - ([employer] [facility])  Secondary Insurance (if applicable): A [employer]   Treatment Diagnosis:    No diagnosis found. 1. Left knee pain, unspecified chronicity  M25.562         2. Effusion of left knee  M25.462         3. Gait difficulty  R26.9       Medical Diagnosis:      Complete tear of anterior cruciate ligament of left knee, initial encounter [K66.153L]   Referring Physician: Byron Castillo MD  PCP: No primary care provider on file. Plan of care signed (Y/N): yes    Date of Patient follow up with Physician: wk of 10/1     Progress Report/POC: NO      POC update due (10 Rx/or 30 days whichever is less [facility]): POC due        Visit # [facility] []Yes    []No     Latex Allergy:  [x]NO      []YES  Preferred Language for Healthcare:   [x]English       []other:    SUBJECTIVE EXAMINATION     Patient Report/Comments: Patient states left quad tightness remains and some soreness after last visit.      OBJECTIVE EXAMINATION     Observation:      Test measurements:      Test used Initial score 2023   Pain Summary VAS 4-5/10    Functional questionnaire LEFS 90% deficit 38%   ROM        See eval 0-145               Strength  See eval 5/5 quad      5/5 ham                 RESTRICTIONS/PRECAUTIONS:     Exercises/Interventions:     Therapeutic Ex (02929) 30 resistance Sets/time Reps Notes/Cues/Progressions   bike  5'     Side stepping blue    Dips on step 8\"

## 2024-01-03 ENCOUNTER — HOSPITAL ENCOUNTER (OUTPATIENT)
Dept: PHYSICAL THERAPY | Age: 24
Setting detail: THERAPIES SERIES
Discharge: HOME | End: 2024-01-03
Payer: COMMERCIAL

## 2024-01-03 PROCEDURE — 97110 THERAPEUTIC EXERCISES: CPT

## 2024-01-03 PROCEDURE — 97530 THERAPEUTIC ACTIVITIES: CPT

## 2024-01-03 NOTE — FLOWSHEET NOTE
intervention. This has a direct and significant impact on the need for therapy and significantly impacts the rate of recovery.     Treatment/Activity Tolerance:  [x] Patient tolerated treatment well [] Patient limited by fatique  [] Patient limited by pain, block    [] Patient limited by other medical complications  [] Other:     Return to Play: NA  Not Ready for Return to Sports    Prognosis for POC: [x] Good [] Fair  [] Poor    Patient requires continued skilled intervention: [x] Yes  [] No      GOALS     Short Term Goals: To be achieved in: 2-4 weeks  Independent in HEP and progression per patient tolerance, in order to progress toward full function and prevent re-injury.               Status: [] Progressing: [x] Met: [] Not Met: [] Adjusted  Patient will have a decrease in pain to 2/10 to help  facilitate improvement in movement, function, and ADLs as indicated by functional deficits.              Status: [] Progressing: [x] Met: [] Not Met: [] Adjusted     Long Term Goals: To be achieved in:  36  weeks  Disability index score of 10% or less for the LEFS to assist with return top prior level of function.                  Status: [] Progressing: [] Met: [x] Not Met: [] Adjusted Currently 35%  LLE AROM = RLE AROM to allow for proper joint functioning as indicated by patients functional deficits.  Status: [] Progressing: [x] Met: [] Not Met: [] Adjusted  Pt to improve strength to show motor control/activation of proximal hip, posterior chain LE, quadriceps, gastroc/soleus, and hamstrings to facilitate functional mobility and ADLs.   Status: [x] Progressing: [] Met: [] Not Met: [] Adjusted  Patient will return to Usual recreational activities, walking around house, getting in/out of a car, walk 2 blocks, up/down 1 flight of stairs, and running without increased symptoms or restriction to work towards return to prior level of function.                                    Status: [x] Progressing: [] Met: [] Not Met: []

## 2024-01-15 ENCOUNTER — HOSPITAL ENCOUNTER (OUTPATIENT)
Dept: PHYSICAL THERAPY | Age: 24
Setting detail: THERAPIES SERIES
Discharge: HOME | End: 2024-01-15
Payer: COMMERCIAL

## 2024-01-15 PROCEDURE — 97530 THERAPEUTIC ACTIVITIES: CPT

## 2024-01-15 PROCEDURE — 97110 THERAPEUTIC EXERCISES: CPT

## 2024-01-15 PROCEDURE — 97140 MANUAL THERAPY 1/> REGIONS: CPT

## 2024-01-15 NOTE — FLOWSHEET NOTE
[facility] - Outpatient Rehabilitation and Therapy [address] [place] [address] office: [phone] fax: [phone]      Physical Therapy Re-Certification Plan of Care/MD UPDATE        Physical Therapy: TREATMENT/PROGRESS NOTE   Patient: Jeffry Berry (23 y.o. male)   Treatment Date: 01/15/2024   :  2000 MRN: [id number]   Visit #: 20   Insurance: Payor: [facility] / Plan: BC -  [place] / Product Type: *No Product type* /   Insurance ID: [id number] - ([facility])  Secondary Insurance (if applicable): A [employer]   Treatment Diagnosis:    No diagnosis found.    1. Left knee pain, unspecified chronicity  M25.562         2. Effusion of left knee  M25.462         3. Gait difficulty  R26.9       Medical Diagnosis:      Complete tear of anterior cruciate ligament of left knee, initial encounter [S84.493A]   Referring Physician: Tyson Woodard MD  PCP: No primary care provider on file.                             Plan of care signed (Y/N): yes    Date of Patient follow up with Physician: wk of 10/1     Progress Report/POC: NO      POC update due (10 Rx/or 30 days whichever is less OR AUTH LIMITS): POC due        Visit # Insurance Allowable Auth Needed   [facility] []Yes    []No     Latex Allergy:  [x]NO      []YES  Preferred Language for Healthcare:   [x]English       []other:    SUBJECTIVE EXAMINATION     Patient Report/Comments: Patient states left knee is feeling pretty good and making great progress with strength.     OBJECTIVE EXAMINATION     Observation:      Test measurements:      Test used Initial score 01/15/2024   Pain Summary VAS 4-5/10    Functional questionnaire LEFS 90% deficit 38%   ROM        See eval 0-145               Strength  See eval 5/5 quad      5/5 ham                 RESTRICTIONS/PRECAUTIONS:     Exercises/Interventions:     Therapeutic Ex (47114) 30 resistance Sets/time Reps Notes/Cues/Progressions   bike  5'     Side stepping blue 15' 2

## 2024-01-19 ENCOUNTER — APPOINTMENT (OUTPATIENT)
Dept: PHYSICAL THERAPY | Age: 24
End: 2024-01-19
Payer: COMMERCIAL

## 2024-01-23 ENCOUNTER — HOSPITAL ENCOUNTER (OUTPATIENT)
Dept: PHYSICAL THERAPY | Age: 24
Setting detail: THERAPIES SERIES
Discharge: HOME | End: 2024-01-23
Payer: COMMERCIAL

## 2024-01-23 PROCEDURE — 97530 THERAPEUTIC ACTIVITIES: CPT

## 2024-01-23 PROCEDURE — 97110 THERAPEUTIC EXERCISES: CPT

## 2024-01-23 PROCEDURE — 97112 NEUROMUSCULAR REEDUCATION: CPT

## 2024-01-23 NOTE — FLOWSHEET NOTE
blocks, up/down 1 flight of stairs, and running without increased symptoms or restriction to work towards return to prior level of function.                                    Status: [x] Progressing: [] Met: [] Not Met: [] Adjusted  Patient will increase LE function to allow independence in all self-care activities.                                                                                                           Status: [] Progressing: [x] Met: [] Not Met: [] Adjusted    Overall Progression Towards Functional goals/ Treatment Progress Update:  [x] Patient is progressing as expected towards functional goals listed.    [] Progression is slowed due to complexities/Impairments listed.  [] Progression has been slowed due to co-morbidities.  [] Plan just implemented, too soon (<30days) to assess goals progression   [] Goals require adjustment due to lack of progress  [] Patient is not progressing as expected and requires additional follow up with physician  [] Other:     CHARGE CAPTURE     CPT Code (TIMED) minutes # CPT Code (UNTIMED) #     [x] Therex (59687)  30 2  [] EVAL:LOW (74771 - Typically 20 minutes face-to-face)     [x] Neuromusc. Re-ed (64776) 10 1  [] Re-Eval (43801)     [] Manual (59921)    [] Estim Unattended (88233)     [x] Ther. Act (50401) 15 1  [] Mech. Traction (90785)     [] Gait (42661)    [] Dry Needle 1-2 muscle (20560)     [] Aquatic Therex (72602)    [] Dry Needle 3+ muscle (20561)     [] Iontophoresis (61843)    [] VASO (16688)     [] Ultrasound (39978)    [] Group Therapy (08365)     [] Estim Attended (70859)    [] Other:    Total Timed Code Tx Minutes 55 4       Total Treatment Minutes 55        Charge Justification:  (73237) THERAPEUTIC EXERCISE - Provided verbal/tactile cueing for activities related to strengthening, flexibility, endurance, ROM performed to prevent loss of range of motion, maintain or improve muscular strength or increase flexibility, following either an injury or

## 2024-01-26 ENCOUNTER — HOSPITAL ENCOUNTER (OUTPATIENT)
Dept: PHYSICAL THERAPY | Age: 24
Setting detail: THERAPIES SERIES
Discharge: HOME | End: 2024-01-26
Payer: COMMERCIAL

## 2024-01-26 PROCEDURE — 97110 THERAPEUTIC EXERCISES: CPT

## 2024-01-26 PROCEDURE — 97530 THERAPEUTIC ACTIVITIES: CPT

## 2024-01-26 NOTE — FLOWSHEET NOTE
[facility] - Outpatient Rehabilitation and Therapy [address] [place] [address] office: [phone] fax: [phone]      Physical Therapy Re-Certification Plan of Care/MD UPDATE        Physical Therapy: TREATMENT/PROGRESS NOTE   Patient: Jeffry Berry (23 y.o. male)   Treatment Date: 2024   :  2000 MRN: [id number]   Visit #: 20   Insurance: Payor: [facility] / Plan: [place] -  [place] / Product Type: *No Product type* /   Insurance ID: [id number] - ([facility])  Secondary Insurance (if applicable): A [facility]   Treatment Diagnosis:    No diagnosis found.    1. Left knee pain, unspecified chronicity  M25.562         2. Effusion of left knee  M25.462         3. Gait difficulty  R26.9       Medical Diagnosis:      Complete tear of anterior cruciate ligament of left knee, initial encounter [S85.621A]   Referring Physician: Tyson Woodard MD  PCP: No primary care provider on file.                             Plan of care signed (Y/N): yes    Date of Patient follow up with Physician: wk of 10/1     Progress Report/POC: NO      POC update due (10 Rx/or 30 days whichever is less OR AUTH LIMITS): POC due        Visit # Insurance Allowable Auth Needed   [facility] []Yes    []No     Latex Allergy:  [x]NO      []YES  Preferred Language for Healthcare:   [x]English       []other:    SUBJECTIVE EXAMINATION     Patient Report/Comments: Patient states left knee is feeling good and seems to get stronger every week.     OBJECTIVE EXAMINATION     Observation:  noted appropriate fatigue by end of session    Test measurements:      Test used Initial score 2024   Pain Summary VAS 4-5/10    Functional questionnaire LEFS 90% deficit 38%   ROM        See eval 0-145               Strength  See eval 5/5 quad      5/5 ham                 RESTRICTIONS/PRECAUTIONS:     Exercises/Interventions:     Therapeutic Ex (43218) 40 resistance Sets/time Reps Notes/Cues/Progressions   bike  5'

## 2024-01-31 ENCOUNTER — HOSPITAL ENCOUNTER (OUTPATIENT)
Dept: PHYSICAL THERAPY | Age: 24
Setting detail: THERAPIES SERIES
Discharge: HOME | End: 2024-01-31
Payer: COMMERCIAL

## 2024-01-31 PROCEDURE — 97110 THERAPEUTIC EXERCISES: CPT

## 2024-01-31 NOTE — FLOWSHEET NOTE
[facility] - Outpatient Rehabilitation and Therapy [address] [place] [address] office: [phone] fax: [phone]      Physical Therapy Re-Certification Plan of Care/MD UPDATE        Physical Therapy: TREATMENT/PROGRESS NOTE   Patient: Jeffry Berry (23 y.o. male)   Treatment Date: 2024   :  2000 MRN: [id number]   Visit #: 20   Insurance: Payor: [facility] / Plan: [place] -  [place] / Product Type: *No Product type* /   Insurance ID: [id number] - ([facility])  Secondary Insurance (if applicable): A [employer]   Treatment Diagnosis:    No diagnosis found.    1. Left knee pain, unspecified chronicity  M25.562         2. Effusion of left knee  M25.462         3. Gait difficulty  R26.9       Medical Diagnosis:      Complete tear of anterior cruciate ligament of left knee, initial encounter [S85.943A]   Referring Physician: Tyson Woodard MD  PCP: No primary care provider on file.                             Plan of care signed (Y/N): yes    Date of Patient follow up with Physician: wk of 10/1     Progress Report/POC: NO      POC update due (10 Rx/or 30 days whichever is less OR AUTH LIMITS): POC due        Visit # Insurance Allowable Auth Needed   [facility] []Yes    []No     Latex Allergy:  [x]NO      []YES  Preferred Language for Healthcare:   [x]English       []other:    SUBJECTIVE EXAMINATION     Patient Report/Comments: Patient reports that his legs are sore today from lifting on Monday. Feels that he is improving overall. Knee feels good.     OBJECTIVE EXAMINATION     Observation:  noted appropriate fatigue by end of session    Test measurements:      Test used Initial score 2024   Pain Summary VAS 4-5/10    Functional questionnaire LEFS 90% deficit 38%   ROM        See eval 0-145               Strength  See eval 5/5 quad      5/5 ham                 RESTRICTIONS/PRECAUTIONS:     Exercises/Interventions:     Therapeutic Ex (66786) 40 resistance

## 2024-02-02 ENCOUNTER — HOSPITAL ENCOUNTER (OUTPATIENT)
Dept: PHYSICAL THERAPY | Age: 24
Setting detail: THERAPIES SERIES
Discharge: HOME | End: 2024-02-02
Payer: COMMERCIAL

## 2024-02-02 PROCEDURE — 97140 MANUAL THERAPY 1/> REGIONS: CPT

## 2024-02-02 PROCEDURE — 97110 THERAPEUTIC EXERCISES: CPT

## 2024-02-02 NOTE — FLOWSHEET NOTE
strengthening, flexibility, endurance, ROM performed to prevent loss of range of motion, maintain or improve muscular strength or increase flexibility, following either an injury or surgery.  (80941) NEUROMUSCULAR RE-EDUCATION- Therapeutic procedure, 1 or more areas, each 15 minutes; neuromuscular reeducation of movement, balance, coordination, kinesthetic sense, posture, and/or proprioception for sitting and/or standing activities  (96477) MANUAL THERAPY-  Manual therapy techniques, 1 or more regions, each 15 minutes (Mobilization/manipulation, manual lymphatic drainage, manual traction) for the purpose of modulating pain, promoting relaxation,  increasing ROM, reducing/eliminating soft tissue swelling/inflammation/restriction, improving soft tissue extensibility and allowing for proper ROM for normal function with self care, mobility, lifting and ambulation  (85882) VASOPNEUMATIC    TREATMENT PLAN   Plan: Cont POC- Continue emphasis/focus on exercise progression, increasing ROM, and improving soft tissue extensibility. Next visit plan to progress weights, progress reps, add new exercises, and progress balance     Electronically Signed by Soraya Olmstead, PT  Date: 02/02/2024     Note: If patient does not return for scheduled/recommended follow up visits, this note will serve as a discharge from care along with the most recent update on progress.

## 2024-02-05 ENCOUNTER — HOSPITAL ENCOUNTER (OUTPATIENT)
Dept: PHYSICAL THERAPY | Age: 24
Setting detail: THERAPIES SERIES
Discharge: HOME | End: 2024-02-05
Payer: COMMERCIAL

## 2024-02-05 PROCEDURE — 97750 PHYSICAL PERFORMANCE TEST: CPT

## 2024-02-05 PROCEDURE — 97110 THERAPEUTIC EXERCISES: CPT

## 2024-02-06 NOTE — PLAN OF CARE
deficits.              Status: [] Progressing: [x] Met: [] Not Met: [] Adjusted     Long Term Goals: To be achieved in:  36  weeks  Disability index score of 10% or less for the LEFS to assist with return top prior level of function.                  Status: [] Progressing: [x] Met: [] Not Met: [] Adjusted Currently 35%  LLE AROM = RLE AROM to allow for proper joint functioning as indicated by patients functional deficits.  Status: [] Progressing: [x] Met: [] Not Met: [] Adjusted  Pt to improve strength to show motor control/activation of proximal hip, posterior chain LE, quadriceps, gastroc/soleus, and hamstrings to facilitate functional mobility and ADLs.   Status: [] Progressing: [x] Met: [] Not Met: [] Adjusted  Patient will return to Usual recreational activities, walking around house, getting in/out of a car, walk 2 blocks, up/down 1 flight of stairs, and running without increased symptoms or restriction to work towards return to prior level of function.                                    Status: [] Progressing: [x] Met: [] Not Met: [] Adjusted  Patient will increase LE function to allow independence in all self-care activities.                                                                                                           Status: [] Progressing: [x] Met: [] Not Met: [] Adjusted    NEW GOALS SET 2/6/2024  Patient will demonstrate improved isokinetic test results to greater than 65%BW of quads at 60deg/sec speed.   Patient will demonstrate SL squat test to 1 minute with results even to uninvolved knee.   Patient will demonstrate sprint and jump mechanics without deviation or compensation.       Overall Progression Towards Functional goals/ Treatment Progress Update:  [x] Patient is progressing as expected towards functional goals listed.    [] Progression is slowed due to complexities/Impairments listed.  [] Progression has been slowed due to co-morbidities.  [] Plan just implemented, too soon

## 2024-02-07 ENCOUNTER — HOSPITAL ENCOUNTER (OUTPATIENT)
Dept: PHYSICAL THERAPY | Age: 24
Setting detail: THERAPIES SERIES
Discharge: HOME | End: 2024-02-07
Payer: COMMERCIAL

## 2024-02-07 PROCEDURE — 97140 MANUAL THERAPY 1/> REGIONS: CPT

## 2024-02-07 PROCEDURE — 97110 THERAPEUTIC EXERCISES: CPT

## 2024-02-07 NOTE — FLOWSHEET NOTE
[facility] [place] - Outpatient Rehabilitation and Therapy [address] [place] [address] office: [phone] fax: [phone]      Physical Therapy Re-Certification Plan of Care/MD UPDATE              Physical Therapy: TREATMENT/PROGRESS NOTE   Patient: Jeffry Berry (23 y.o. male)   Treatment Date: 2024   :  2000 MRN: [id number]   Visit #: 20   Insurance: Payor: [facility] / Plan: [place] -  [place] / Product Type: *No Product type* /   Insurance ID: [id number] - ([facility])  Secondary Insurance (if applicable): A [employer]   Treatment Diagnosis:    No diagnosis found.    1. Left knee pain, unspecified chronicity  M25.562         2. Effusion of left knee  M25.462         3. Gait difficulty  R26.9       Medical Diagnosis:      Complete tear of anterior cruciate ligament of left knee, initial encounter [S85.933A]   Referring Physician: Tyson Woodard MD  PCP: No primary care provider on file.                             Plan of care signed (Y/N): yes    Date of Patient follow up with Physician: wk of 10/1     Progress Report/POC: YES, Date Range for this report: 2023-2024       POC update due (10 Rx/or 30 days whichever is less OR AUTH LIMITS): POC due 3/6/2024       Visit # Insurance Allowable Auth Needed   [facility] []Yes    []No     Latex Allergy:  [x]NO      []YES  Preferred Language for Healthcare:   [x]English       []other:    SUBJECTIVE EXAMINATION     Patient Report/Comments: Patient reports that he has some increased soreness from running with football trainers yesterday. Feels that knee is doing well. No complaints of pain. States that testing showed some weakness in B quads.     OBJECTIVE EXAMINATION     Observation:  noted appropriate fatigue by end of session    Test measurements:      Isokinetic Test Results:  (see media for scanned results)  Bilateral Difference:  Quadricep 60 deg/sec: 15% [x] Deficit   [] Surplus 180 deg/sec: 23% [x] Deficit   []

## 2024-02-09 ENCOUNTER — HOSPITAL ENCOUNTER (OUTPATIENT)
Dept: PHYSICAL THERAPY | Age: 24
Setting detail: THERAPIES SERIES
Discharge: HOME | End: 2024-02-09
Payer: COMMERCIAL

## 2024-02-09 PROCEDURE — 97110 THERAPEUTIC EXERCISES: CPT

## 2024-02-09 PROCEDURE — 97530 THERAPEUTIC ACTIVITIES: CPT

## 2024-02-09 PROCEDURE — 97112 NEUROMUSCULAR REEDUCATION: CPT

## 2024-02-09 NOTE — FLOWSHEET NOTE
[facility] - Outpatient Rehabilitation and Therapy [address] [place] [address] office: [phone] fax: [phone]      Physical Therapy Re-Certification Plan of Care/MD UPDATE              Physical Therapy: TREATMENT/PROGRESS NOTE   Patient: Jeffry Berry (23 y.o. male)   Treatment Date: 2024   :  2000 MRN: [id number]   Visit #: 20   Insurance: Payor: [facility] / Plan[place] -  [place] / Product Type: *No Product type* /   Insurance ID: [id number] - ([facility])  Secondary Insurance (if applicable): A [employer]   Treatment Diagnosis:    No diagnosis found.    1. Left knee pain, unspecified chronicity  M25.562         2. Effusion of left knee  M25.462         3. Gait difficulty  R26.9       Medical Diagnosis:      Complete tear of anterior cruciate ligament of left knee, initial encounter [S84.860A]   Referring Physician: Tyson Woodard MD  PCP: No primary care provider on file.                             Plan of care signed (Y/N): yes    Date of Patient follow up with Physician: wk of 10/1     Progress Report/POC: YES, Date Range for this report: 2023-2024       POC update due (10 Rx/or 30 days whichever is less OR AUTH LIMITS): POC due 3/6/2024       Visit # Insurance Allowable Auth Needed   [facility] []Yes    []No     Latex Allergy:  [x]NO      []YES  Preferred Language for Healthcare:   [x]English       []other:    SUBJECTIVE EXAMINATION     Patient Report/Comments: Patient reports that he has some increased soreness from running with football trainers yesterday. Feels that knee is doing well. No complaints of pain. States that testing showed some weakness in B quads.     OBJECTIVE EXAMINATION     Observation:  noted appropriate fatigue by end of session    Test measurements:      Isokinetic Test Results:  (see media for scanned results)  Bilateral Difference:  Quadricep 60 deg/sec: 15% [x] Deficit   [] Surplus 180 deg/sec: 23% [x] Deficit   []

## 2024-02-12 ENCOUNTER — HOSPITAL ENCOUNTER (OUTPATIENT)
Dept: PHYSICAL THERAPY | Age: 24
Setting detail: THERAPIES SERIES
Discharge: HOME | End: 2024-02-12
Payer: COMMERCIAL

## 2024-02-12 PROCEDURE — 97140 MANUAL THERAPY 1/> REGIONS: CPT

## 2024-02-12 PROCEDURE — 97530 THERAPEUTIC ACTIVITIES: CPT

## 2024-02-12 PROCEDURE — 97110 THERAPEUTIC EXERCISES: CPT

## 2024-02-12 NOTE — FLOWSHEET NOTE
[facility] - Outpatient Rehabilitation and Therapy [address] [place] [address] office: [phone] fax: [phone]      Physical Therapy Re-Certification Plan of Care/MD UPDATE      Physical Therapy: TREATMENT/PROGRESS NOTE   Patient: Jeffry Berry (23 y.o. male)   Treatment Date: 2024   :  2000 MRN: [id number]   Visit #: 20   Insurance: Payor: [facility] / Plan[place] -  [place] / Product Type: *No Product type* /   Insurance ID: [id number] - ([facility])  Secondary Insurance (if applicable): A [employer]   Treatment Diagnosis:    No diagnosis found.    1. Left knee pain, unspecified chronicity  M25.562         2. Effusion of left knee  M25.462         3. Gait difficulty  R26.9       Medical Diagnosis:      Complete tear of anterior cruciate ligament of left knee, initial encounter [S81.902A]   Referring Physician: Tyson Woodard MD  PCP: No primary care provider on file.                             Plan of care signed (Y/N): yes    Date of Patient follow up with Physician: wk of 10/1     Progress Report/POC: NO      POC update due (10 Rx/or 30 days whichever is less OR AUTH LIMITS): POC due 3/6/2024       Visit # Insurance Allowable Auth Needed   [facility] []Yes    []No     Latex Allergy:  [x]NO      []YES  Preferred Language for Healthcare:   [x]English       []other:    SUBJECTIVE EXAMINATION     Patient Report/Comments: Patient reports feeling pretty good with all progression of running and early plyo work.     OBJECTIVE EXAMINATION     Observation:  noted appropriate fatigue by end of session    Test measurements:      Isokinetic Test Results:  (see media for scanned results)  Bilateral Difference:  Quadricep 60 deg/sec: 15% [x] Deficit   [] Surplus 180 deg/sec: 23% [x] Deficit   [] Surplus   Hamstring 60 deg/sec: 25% [] Deficit   [x] Surplus 180 deg/sec: 26% [] Deficit   [x] Surplus      Normative Data, 60 degrees/second:  Quadricep Normal: >85% Patient:

## 2024-02-15 ENCOUNTER — HOSPITAL ENCOUNTER (OUTPATIENT)
Dept: PHYSICAL THERAPY | Age: 24
Setting detail: THERAPIES SERIES
Discharge: HOME | End: 2024-02-15
Payer: COMMERCIAL

## 2024-02-15 PROCEDURE — 97110 THERAPEUTIC EXERCISES: CPT

## 2024-02-15 PROCEDURE — 97530 THERAPEUTIC ACTIVITIES: CPT

## 2024-02-15 NOTE — FLOWSHEET NOTE
[facility] - Outpatient Rehabilitation and Therapy [address] [place] [address] office: [phone] fax: [phone]      Physical Therapy Re-Certification Plan of Care/MD UPDATE      Physical Therapy: TREATMENT/PROGRESS NOTE   Patient: Jeffry Berry (23 y.o. male)   Treatment Date: 02/15/2024   :  2000 MRN: [id number]   Visit #: 20   Insurance: Payor: [facility] / Plan[place] -  [place] / Product Type: *No Product type* /   Insurance ID: [id number] - ([facility])  Secondary Insurance (if applicable): A [employer]   Treatment Diagnosis:    No diagnosis found.    1. Left knee pain, unspecified chronicity  M25.562         2. Effusion of left knee  M25.462         3. Gait difficulty  R26.9       Medical Diagnosis:      Complete tear of anterior cruciate ligament of left knee, initial encounter [S87.321A]   Referring Physician: Tyson Woodard MD  PCP: No primary care provider on file.                             Plan of care signed (Y/N): yes    Date of Patient follow up with Physician: wk of 10/1     Progress Report/POC: NO      POC update due (10 Rx/or 30 days whichever is less OR AUTH LIMITS): POC due 3/6/2024       Visit # Insurance Allowable Auth Needed   50 [facility] []Yes    []No     Latex Allergy:  [x]NO      []YES  Preferred Language for Healthcare:   [x]English       []other:    SUBJECTIVE EXAMINATION     Patient Report/Comments: Patient reports that he forgot to bring his brace today. He [place]. Jeffry notes that he has some difficulty controlling the L knee and quad where he     OBJECTIVE EXAMINATION     Observation:  noted appropriate fatigue by end of session    Test measurements:      Isokinetic Test Results:  (see media for scanned results)  Bilateral Difference:  Quadricep 60 deg/sec: 15% [x] Deficit   [] Surplus 180 deg/sec: 23% [x] Deficit   [] Surplus   Hamstring 60 deg/sec: 25% [] Deficit

## 2024-02-19 ENCOUNTER — HOSPITAL ENCOUNTER (OUTPATIENT)
Dept: PHYSICAL THERAPY | Age: 24
Setting detail: THERAPIES SERIES
Discharge: HOME | End: 2024-02-19
Payer: COMMERCIAL

## 2024-02-19 PROCEDURE — 97110 THERAPEUTIC EXERCISES: CPT

## 2024-02-19 PROCEDURE — 97140 MANUAL THERAPY 1/> REGIONS: CPT

## 2024-02-19 PROCEDURE — 97530 THERAPEUTIC ACTIVITIES: CPT

## 2024-02-19 NOTE — FLOWSHEET NOTE
[facility] - Outpatient Rehabilitation and Therapy [address] [place] [address] office: [phone] fax: [phone]      Physical Therapy Re-Certification Plan of Care/MD UPDATE      Physical Therapy: TREATMENT/PROGRESS NOTE   Patient: Jeffry Berry (23 y.o. male)   Treatment Date: 2024   :  2000 MRN: [id number]   Visit #: 20   Insurance: Payor: BC / [place] [employer] -  [place] / Product Type: *No Product type* /   Insurance ID: [id number] - ([facility])  Secondary Insurance (if applicable): A [employer]   Treatment Diagnosis:    No diagnosis found.    1. Left knee pain, unspecified chronicity  M25.562         2. Effusion of left knee  M25.462         3. Gait difficulty  R26.9       Medical Diagnosis:      Complete tear of anterior cruciate ligament of left knee, initial encounter [S84.390A]   Referring Physician: Tyson Woodard MD  PCP: No primary care provider on file.                             Plan of care signed (Y/N): yes    Date of Patient follow up with Physician: wk of 10/1     Progress Report/POC: NO      POC update due (10 Rx/or 30 days whichever is less OR AUTH LIMITS): POC due 3/6/2024       Visit # Insurance Allowable Auth Needed   51 [facility] []Yes    []No     Latex Allergy:  [x]NO      []YES  Preferred Language for Healthcare:   [x]English       []other:    SUBJECTIVE EXAMINATION     Patient Report/Comments: Patient reports left knee is feeling pretty good. Some quad tightness noted today.     OBJECTIVE EXAMINATION     Observation:  noted appropriate fatigue by end of session    Test measurements:      Isokinetic Test Results:  (see media for scanned results)  Bilateral Difference:  Quadricep 60 deg/sec: 15% [x] Deficit   [] Surplus 180 deg/sec: 23% [x] Deficit   [] Surplus   Hamstring 60 deg/sec: 25% [] Deficit   [x] Surplus 180 deg/sec: 26% [] Deficit   [x] Surplus      Normative Data, 60 degrees/second:  Quadricep Normal: >85% Patient: 47%

## 2024-02-21 ENCOUNTER — HOSPITAL ENCOUNTER (OUTPATIENT)
Dept: PHYSICAL THERAPY | Age: 24
Setting detail: THERAPIES SERIES
Discharge: HOME | End: 2024-02-21
Payer: COMMERCIAL

## 2024-02-21 PROCEDURE — 97112 NEUROMUSCULAR REEDUCATION: CPT

## 2024-02-21 PROCEDURE — 97140 MANUAL THERAPY 1/> REGIONS: CPT

## 2024-02-21 PROCEDURE — 97110 THERAPEUTIC EXERCISES: CPT

## 2024-02-21 NOTE — FLOWSHEET NOTE
kinesthetic sense, posture, and/or proprioception for sitting and/or standing activities  (25077) MANUAL THERAPY-  Manual therapy techniques, 1 or more regions, each 15 minutes (Mobilization/manipulation, manual lymphatic drainage, manual traction) for the purpose of modulating pain, promoting relaxation,  increasing ROM, reducing/eliminating soft tissue swelling/inflammation/restriction, improving soft tissue extensibility and allowing for proper ROM for normal function with self care, mobility, lifting and ambulation  (53214) VASOPNEUMATIC    TREATMENT PLAN   Plan: Cont POC- Continue emphasis/focus on exercise progression, increasing ROM, and improving soft tissue extensibility. Next visit plan to progress weights, progress reps, add new exercises, and progress balance     Electronically Signed by Khushbu Bejarano PTA  Date: 02/21/2024     Note: If patient does not return for scheduled/recommended follow up visits, this note will serve as a discharge from care along with the most recent update on progress.

## 2024-02-26 ENCOUNTER — HOSPITAL ENCOUNTER (OUTPATIENT)
Dept: PHYSICAL THERAPY | Age: 24
Setting detail: THERAPIES SERIES
Discharge: HOME | End: 2024-02-26
Payer: COMMERCIAL

## 2024-02-26 PROCEDURE — 97140 MANUAL THERAPY 1/> REGIONS: CPT

## 2024-02-26 PROCEDURE — 97110 THERAPEUTIC EXERCISES: CPT

## 2024-02-26 PROCEDURE — 97530 THERAPEUTIC ACTIVITIES: CPT

## 2024-02-26 NOTE — FLOWSHEET NOTE
listed.  [] Progression has been slowed due to co-morbidities.  [] Plan just implemented, too soon (<30days) to assess goals progression   [] Goals require adjustment due to lack of progress  [] Patient is not progressing as expected and requires additional follow up with physician  [] Other:     CHARGE CAPTURE     CPT Code (TIMED) minutes # CPT Code (UNTIMED) #     [x] Therex (86464)  30 2  [] EVAL:LOW (23654 - Typically 20 minutes face-to-face)     [] Neuromusc. Re-ed (05479)    [] Re-Eval (35656)     [x] Manual (27702) 15 1  [] Estim Unattended (46907)     [x] Ther. Act (33843) 10 1  [] Mech. Traction (45878)     [] Gait (66815)    [] Dry Needle 1-2 muscle (20560)     [] Aquatic Therex (43679)    [] Dry Needle 3+ muscle (20561)     [] Iontophoresis (35109)    [] VASO (41846)     [] Ultrasound (79814)    [] Group Therapy (73728)     [] Estim Attended (20724)    [] Other:    Total Timed Code Tx Minutes 55 4       Total Treatment Minutes 61        Charge Justification:  (17035) THERAPEUTIC EXERCISE - Provided verbal/tactile cueing for activities related to strengthening, flexibility, endurance, ROM performed to prevent loss of range of motion, maintain or improve muscular strength or increase flexibility, following either an injury or surgery.   (96464) HOME EXERCISE PROGRAM- Reviewed/Progressed HEP activities related to strengthening, flexibility, endurance, ROM performed to prevent loss of range of motion, maintain or improve muscular strength or increase flexibility, following either an injury or surgery.  (00254) NEUROMUSCULAR RE-EDUCATION- Therapeutic procedure, 1 or more areas, each 15 minutes; neuromuscular reeducation of movement, balance, coordination, kinesthetic sense, posture, and/or proprioception for sitting and/or standing activities  (66324) MANUAL THERAPY-  Manual therapy techniques, 1 or more regions, each 15 minutes (Mobilization/manipulation, manual lymphatic drainage, manual traction) for the

## 2024-02-28 ENCOUNTER — HOSPITAL ENCOUNTER (OUTPATIENT)
Dept: PHYSICAL THERAPY | Age: 24
Setting detail: THERAPIES SERIES
Discharge: HOME | End: 2024-02-28
Payer: COMMERCIAL

## 2024-02-28 PROCEDURE — 97110 THERAPEUTIC EXERCISES: CPT

## 2024-02-28 PROCEDURE — 97140 MANUAL THERAPY 1/> REGIONS: CPT

## 2024-02-28 PROCEDURE — 97530 THERAPEUTIC ACTIVITIES: CPT

## 2024-02-28 NOTE — FLOWSHEET NOTE
THERAPY-  Manual therapy techniques, 1 or more regions, each 15 minutes (Mobilization/manipulation, manual lymphatic drainage, manual traction) for the purpose of modulating pain, promoting relaxation,  increasing ROM, reducing/eliminating soft tissue swelling/inflammation/restriction, improving soft tissue extensibility and allowing for proper ROM for normal function with self care, mobility, lifting and ambulation  (97773) VASOPNEUMATIC    TREATMENT PLAN   Plan: Cont POC- Continue emphasis/focus on exercise progression, increasing ROM, and improving soft tissue extensibility. Next visit plan to progress weights, progress reps, add new exercises, and progress balance     Electronically Signed by Khushbu Bejarano, PTA  Date: 02/28/2024     Note: If patient does not return for scheduled/recommended follow up visits, this note will serve as a discharge from care along with the most recent update on progress.

## 2024-03-04 ENCOUNTER — HOSPITAL ENCOUNTER (OUTPATIENT)
Dept: PHYSICAL THERAPY | Age: 24
Setting detail: THERAPIES SERIES
Discharge: HOME | End: 2024-03-04
Payer: COMMERCIAL

## 2024-03-04 PROCEDURE — 97110 THERAPEUTIC EXERCISES: CPT

## 2024-03-04 PROCEDURE — 97140 MANUAL THERAPY 1/> REGIONS: CPT

## 2024-03-04 PROCEDURE — 97530 THERAPEUTIC ACTIVITIES: CPT

## 2024-03-04 NOTE — FLOWSHEET NOTE
Status: [] Progressing: [x] Met: [] Not Met: [] Adjusted  Patient will have a decrease in pain to 2/10 to help  facilitate improvement in movement, function, and ADLs as indicated by functional deficits.              Status: [] Progressing: [x] Met: [] Not Met: [] Adjusted     Long Term Goals: To be achieved in:  36  weeks  Disability index score of 10% or less for the LEFS to assist with return top prior level of function.                  Status: [] Progressing: [x] Met: [] Not Met: [] Adjusted Currently 35%  LLE AROM = RLE AROM to allow for proper joint functioning as indicated by patients functional deficits.  Status: [] Progressing: [x] Met: [] Not Met: [] Adjusted  Pt to improve strength to show motor control/activation of proximal hip, posterior chain LE, quadriceps, gastroc/soleus, and hamstrings to facilitate functional mobility and ADLs.   Status: [] Progressing: [x] Met: [] Not Met: [] Adjusted  Patient will return to Usual recreational activities, walking around house, getting in/out of a car, walk 2 blocks, up/down 1 flight of stairs, and running without increased symptoms or restriction to work towards return to prior level of function.                                    Status: [] Progressing: [x] Met: [] Not Met: [] Adjusted  Patient will increase LE function to allow independence in all self-care activities.                                                                                                           Status: [] Progressing: [x] Met: [] Not Met: [] Adjusted    NEW GOALS SET 2/6/2024  Patient will demonstrate improved isokinetic test results to greater than 65%BW of quads at 60deg/sec speed.   Patient will demonstrate SL squat test to 1 minute with results even to uninvolved knee.   Patient will demonstrate sprint and jump mechanics without deviation or compensation.       Overall Progression Towards Functional goals/ Treatment Progress Update:  [x] Patient is progressing as

## 2024-03-06 ENCOUNTER — HOSPITAL ENCOUNTER (OUTPATIENT)
Dept: PHYSICAL THERAPY | Age: 24
Setting detail: THERAPIES SERIES
Discharge: HOME | End: 2024-03-06
Payer: COMMERCIAL

## 2024-03-06 PROCEDURE — 97530 THERAPEUTIC ACTIVITIES: CPT

## 2024-03-06 PROCEDURE — 97140 MANUAL THERAPY 1/> REGIONS: CPT

## 2024-03-06 PROCEDURE — 97112 NEUROMUSCULAR REEDUCATION: CPT

## 2024-03-06 PROCEDURE — 97110 THERAPEUTIC EXERCISES: CPT

## 2024-03-06 NOTE — FLOWSHEET NOTE
progression per patient tolerance, in order to progress toward full function and prevent re-injury.               Status: [] Progressing: [x] Met: [] Not Met: [] Adjusted  Patient will have a decrease in pain to 2/10 to help  facilitate improvement in movement, function, and ADLs as indicated by functional deficits.              Status: [] Progressing: [x] Met: [] Not Met: [] Adjusted     Long Term Goals: To be achieved in:  36  weeks  Disability index score of 10% or less for the LEFS to assist with return top prior level of function.                  Status: [] Progressing: [x] Met: [] Not Met: [] Adjusted Currently 35%  LLE AROM = RLE AROM to allow for proper joint functioning as indicated by patients functional deficits.  Status: [] Progressing: [x] Met: [] Not Met: [] Adjusted  Pt to improve strength to show motor control/activation of proximal hip, posterior chain LE, quadriceps, gastroc/soleus, and hamstrings to facilitate functional mobility and ADLs.   Status: [] Progressing: [x] Met: [] Not Met: [] Adjusted  Patient will return to Usual recreational activities, walking around house, getting in/out of a car, walk 2 blocks, up/down 1 flight of stairs, and running without increased symptoms or restriction to work towards return to prior level of function.                                    Status: [] Progressing: [x] Met: [] Not Met: [] Adjusted  Patient will increase LE function to allow independence in all self-care activities.                                                                                                           Status: [] Progressing: [x] Met: [] Not Met: [] Adjusted    NEW GOALS SET 2/6/2024  Patient will demonstrate improved isokinetic test results to greater than 65%BW of quads at 60deg/sec speed.   Patient will demonstrate SL squat test to 1 minute with results even to uninvolved knee.   Patient will demonstrate sprint and jump mechanics without deviation or compensation. 
      Overall Progression Towards Functional goals/ Treatment Progress Update:  [x] Patient is progressing as expected towards functional goals listed.    [] Progression is slowed due to complexities/Impairments listed.  [] Progression has been slowed due to co-morbidities.  [] Plan just implemented, too soon (<30days) to assess goals progression   [] Goals require adjustment due to lack of progress  [] Patient is not progressing as expected and requires additional follow up with physician  [] Other:     CHARGE CAPTURE     CPT Code (TIMED) minutes # CPT Code (UNTIMED) #     [x] Therex (20390)  14 1  [] EVAL:LOW (15329 - Typically 20 minutes face-to-face)     [] Neuromusc. Re-ed (00212)    [] Re-Eval (83329)     [x] Manual (02966) 16 1  [] Estim Unattended (75431)     [x] Ther. Act (70092) 25 2  [] Mech. Traction (23575)     [] Gait (75634)    [] Dry Needle 1-2 muscle (20560)     [] Aquatic Therex (21639)    [] Dry Needle 3+ muscle (20561)     [] Iontophoresis (90764)    [] VASO (77004)     [] Ultrasound (44241)    [] Group Therapy (53147)     [] Estim Attended (87401)    [] Other:    Total Timed Code Tx Minutes 55 4       Total Treatment Minutes 61        Charge Justification:  (43279) THERAPEUTIC EXERCISE - Provided verbal/tactile cueing for activities related to strengthening, flexibility, endurance, ROM performed to prevent loss of range of motion, maintain or improve muscular strength or increase flexibility, following either an injury or surgery.   (80822) HOME EXERCISE PROGRAM- Reviewed/Progressed HEP activities related to strengthening, flexibility, endurance, ROM performed to prevent loss of range of motion, maintain or improve muscular strength or increase flexibility, following either an injury or surgery.  (84839) NEUROMUSCULAR RE-EDUCATION- Therapeutic procedure, 1 or more areas, each 15 minutes; neuromuscular reeducation of movement, balance, coordination, kinesthetic sense, posture, and/or

## 2024-03-12 ENCOUNTER — HOSPITAL ENCOUNTER (OUTPATIENT)
Dept: PHYSICAL THERAPY | Age: 24
Setting detail: THERAPIES SERIES
Discharge: HOME | End: 2024-03-12
Payer: COMMERCIAL

## 2024-03-12 PROCEDURE — 97140 MANUAL THERAPY 1/> REGIONS: CPT

## 2024-03-12 PROCEDURE — 97530 THERAPEUTIC ACTIVITIES: CPT

## 2024-03-12 PROCEDURE — 97110 THERAPEUTIC EXERCISES: CPT

## 2024-03-12 NOTE — FLOWSHEET NOTE
posture, and/or proprioception for sitting and/or standing activities  (35099) MANUAL THERAPY-  Manual therapy techniques, 1 or more regions, each 15 minutes (Mobilization/manipulation, manual lymphatic drainage, manual traction) for the purpose of modulating pain, promoting relaxation,  increasing ROM, reducing/eliminating soft tissue swelling/inflammation/restriction, improving soft tissue extensibility and allowing for proper ROM for normal function with self care, mobility, lifting and ambulation  (99580) VASOPNEUMATIC    TREATMENT PLAN   Plan: Cont POC- Continue emphasis/focus on exercise progression, increasing ROM, and improving soft tissue extensibility. Next visit plan to progress weights, progress reps, add new exercises, and progress balance     Electronically Signed by Soraya Olmstead, PT  Date: 03/12/2024     Note: If patient does not return for scheduled/recommended follow up visits, this note will serve as a discharge from care along with the most recent update on progress.

## 2024-03-14 ENCOUNTER — HOSPITAL ENCOUNTER (OUTPATIENT)
Dept: PHYSICAL THERAPY | Age: 24
Setting detail: THERAPIES SERIES
Discharge: HOME | End: 2024-03-14
Payer: COMMERCIAL

## 2024-03-14 PROCEDURE — 97530 THERAPEUTIC ACTIVITIES: CPT

## 2024-03-14 PROCEDURE — 97140 MANUAL THERAPY 1/> REGIONS: CPT

## 2024-03-14 PROCEDURE — 97110 THERAPEUTIC EXERCISES: CPT

## 2024-03-14 NOTE — FLOWSHEET NOTE
[facility] - Outpatient Rehabilitation and Therapy [address] [place] [address] office: [phone] fax: [phone]      Physical Therapy Re-Certification Plan of Care/MD UPDATE      Physical Therapy: TREATMENT/PROGRESS NOTE   Patient: Jeffry Berry (23 y.o. male)   Treatment Date: 2024   :  2000 MRN: [id number]   Visit #: 20   Insurance: Payor: [facility] / Plan: [place] -  [place] / Product Type: *No Product type* /   Insurance ID: [id number] - ([facility])  Secondary Insurance (if applicable): A [employer]   Treatment Diagnosis:    No diagnosis found.    1. Left knee pain, unspecified chronicity  M25.562         2. Effusion of left knee  M25.462         3. Gait difficulty  R26.9       Medical Diagnosis:      Complete tear of anterior cruciate ligament of left knee, initial encounter [S85.859A]   Referring Physician: Tyson Woodard MD  PCP: No primary care provider on file.                             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    OBJECTIVE EXAMINATION     Observation:  noted appropriate fatigue by end of session    Test measurements:      Isokinetic Test Results:  (see media for scanned results)  Bilateral Difference:  Quadricep 60 deg/sec: 15% [x] Deficit   [] Surplus 180 deg/sec: 23% [x] Deficit   [] Surplus   Hamstring 60 deg/sec: 25% [] Deficit   [x] Surplus 180 deg/sec: 26% [] Deficit   [x] Surplus

## 2024-03-19 ENCOUNTER — HOSPITAL ENCOUNTER (OUTPATIENT)
Dept: PHYSICAL THERAPY | Age: 24
Setting detail: THERAPIES SERIES
Discharge: HOME | End: 2024-03-19
Payer: COMMERCIAL

## 2024-03-19 PROCEDURE — 97140 MANUAL THERAPY 1/> REGIONS: CPT

## 2024-03-19 PROCEDURE — 97110 THERAPEUTIC EXERCISES: CPT

## 2024-03-19 PROCEDURE — 97530 THERAPEUTIC ACTIVITIES: CPT

## 2024-03-19 NOTE — FLOWSHEET NOTE
[facility] - Outpatient Rehabilitation and Therapy [address] [place] [address] office: [phone] fax: [phone]      Physical Therapy Re-Certification Plan of Care/MD UPDATE      Physical Therapy: TREATMENT/PROGRESS NOTE   Patient: Jeffry Berry (23 y.o. male)   Treatment Date: 2024   :  2000 MRN: [id number]   Visit #: 20   Insurance: Payor: BC / Plan[place] [employer] -  [place] / Product Type: *No Product type* /   Insurance ID: [id number] - ([facility])  Secondary Insurance (if applicable): A [employer]   Treatment Diagnosis:    No diagnosis found.    1. Left knee pain, unspecified chronicity  M25.562         2. Effusion of left knee  M25.462         3. Gait difficulty  R26.9       Medical Diagnosis:      Complete tear of anterior cruciate ligament of left knee, initial encounter [S80.494A]   Referring Physician: Tyson Woodard MD  PCP: No primary care provider on file.                             Plan of care signed (Y/N): yes    Date of Patient follow up with Physician: wk of 10/1     Progress Report/POC: NO      POC update due (10 Rx/or 30 days whichever is less OR AUTH LIMITS): POC due 3/6/2024       Visit # Insurance Allowable Auth Needed   59 [facility] []Yes    []No     Latex Allergy:  [x]NO      []YES  Preferred Language for Healthcare:   [x]English       []other:    SUBJECTIVE EXAMINATION     Patient Report/Comments: Patient states continued improvement in strength. Some slight soreness in calf and ITB.     OBJECTIVE EXAMINATION     Observation:  noted appropriate fatigue by end of session    Test measurements:      Isokinetic Test Results:  (see media for scanned results)  Bilateral Difference:  Quadricep 60 deg/sec: 15% [x] Deficit   [] Surplus 180 deg/sec: 23% [x] Deficit   [] Surplus   Hamstring 60 deg/sec: 25% [] Deficit   [x] Surplus 180 deg/sec: 26% [] Deficit   [x] Surplus      Normative Data, 60 degrees/second:  Quadricep Normal: >85% Patient: 47%

## 2024-03-21 ENCOUNTER — HOSPITAL ENCOUNTER (OUTPATIENT)
Dept: PHYSICAL THERAPY | Age: 24
Setting detail: THERAPIES SERIES
Discharge: HOME | End: 2024-03-21
Payer: COMMERCIAL

## 2024-03-21 PROCEDURE — 97530 THERAPEUTIC ACTIVITIES: CPT

## 2024-03-21 PROCEDURE — 97750 PHYSICAL PERFORMANCE TEST: CPT

## 2024-03-21 PROCEDURE — 97110 THERAPEUTIC EXERCISES: CPT

## 2024-03-21 NOTE — PLAN OF CARE
[facility] - Outpatient Rehabilitation and Therapy [address] [place] [address] office: [phone] fax: [phone]          Physical Therapy Re-Certification Plan of Care/MD UPDATE      Dear Dr. Woodard  ,    We had the pleasure of treating the following patient for physical therapy services at [facility].  A summary of our findings can be found in the updated assessment below.  This includes our plan of care.  If you have any questions or concerns regarding these findings, please do not hesitate to contact me at the office phone number checked above.  Thank you for the referral.     Physician Signature:________________________________Date:__________________  By signing above (or electronic signature), therapist’s plan is approved by physician      Current Functional Status:   Jeffry Berry 2000 continues to present with functional deficits in strength symmetry, endurance of strength, and eccentric control  limiting ability with walking up/down stairs, pushing or pulling activity, and running/jumping/change of direction  .  During therapy this date, patient required verbal cueing, tactile cueing, progression of exercises and program, and manual interventions for exercise progression. Patient will continue to benefit from ongoing evaluation and advanced clinical decision from a Physical Therapist to improve muscle strength, neuromuscular control, endurance, balance and proprioception, and high level IADLs to safely return to PLOF and advanced sport activity without symptoms or restrictions.    Overall Response to Treatment:   [x]Patient is responding well to treatment and improvement is noted with regards to goals   [x]Patient should continue to improve in reasonable time if they continue HEP   []Patient has plateaued and is no longer responding to skilled PT intervention    []Patient is getting worse and would benefit from return to referring

## 2024-04-02 ENCOUNTER — HOSPITAL ENCOUNTER (OUTPATIENT)
Dept: PHYSICAL THERAPY | Age: 24
Setting detail: THERAPIES SERIES
Discharge: HOME | End: 2024-04-02
Payer: COMMERCIAL

## 2024-04-02 PROCEDURE — 97530 THERAPEUTIC ACTIVITIES: CPT

## 2024-04-02 PROCEDURE — 97110 THERAPEUTIC EXERCISES: CPT

## 2024-04-02 PROCEDURE — 97140 MANUAL THERAPY 1/> REGIONS: CPT

## 2024-04-02 NOTE — FLOWSHEET NOTE
in [place]/slow return  1 5 Each way   Sled pushes and pulls  2 blue/2 yellow plates    SL box jumps, lateral/fwd  each     Modalities:    Vasopneumatic Compression (Game Ready): Applied to Knee Left for significant edema, swelling, pain control for 15 minutes.  Relevant ICD-10 R22.4 Localized swelling of lower limb    Education/Home Exercise Program: [id number] medbridge code.  Reviewed diagnosis, POC, HEP and its importance.        ASSESSMENT     Today's Assessment: Natanael is making great progress with rehab to date and should be able to get into more sport specific training by end of month.     Medical Necessity Documentation:  I certify that this patient meets the below criteria necessary for medical necessity for care and/or justification of therapy services:  The patient has a musculoskeletal condition(s) with a corresponding ICD-10 code that is of complexity and severity that require skilled therapeutic intervention. This has a direct and significant impact on the need for therapy and significantly impacts the rate of recovery.     Treatment/Activity Tolerance:  [x] Patient tolerated treatment well [] Patient limited by fatique  [] Patient limited by pain, block    [] Patient limited by other medical complications  [] Other:     Return to Play: NA  Not Ready for Return to Sports    Prognosis for POC: [x] Good [] Fair  [] Poor    Patient requires continued skilled intervention: [x] Yes  [] No      GOALS     Short Term Goals: To be achieved in: 2-4 weeks  Independent in HEP and progression per patient tolerance, in order to progress toward full function and prevent re-injury.               Status: [] Progressing: [x] Met: [] Not Met: [] Adjusted  Patient will have a decrease in pain to 2/10 to help  facilitate improvement in movement, function, and ADLs as indicated by functional deficits.              Status: [] Progressing: [x] Met: [] Not Met: [] Adjusted     Long Term Goals: To be achieved in:  36  weeks  Disability

## 2024-04-04 ENCOUNTER — HOSPITAL ENCOUNTER (OUTPATIENT)
Dept: PHYSICAL THERAPY | Age: 24
Setting detail: THERAPIES SERIES
Discharge: HOME | End: 2024-04-04
Payer: COMMERCIAL

## 2024-04-04 PROCEDURE — 97110 THERAPEUTIC EXERCISES: CPT

## 2024-04-04 PROCEDURE — 97530 THERAPEUTIC ACTIVITIES: CPT

## 2024-04-04 PROCEDURE — 97140 MANUAL THERAPY 1/> REGIONS: CPT

## 2024-04-04 NOTE — FLOWSHEET NOTE
Neuromusc. Re-ed (77804)    [] Re-Eval (94385)     [x] Manual (80901) 15 1  [] Estim Unattended (10547)     [x] Ther. Act (63362) 20 1  [] Mech. Traction (42944)     [] Gait (48998)    [] Dry Needle 1-2 muscle (20560)     [] Aquatic Therex (97113)    [] Dry Needle 3+ muscle (20561)     [] Iontophoresis (21070)    [] VASO (60882)     [] Ultrasound (69191)    [] Group Therapy (73053)     [] Estim Attended (56909)    [] Other:phys perf test    Total Timed Code Tx Minutes 65 4       Total Treatment Minutes 65        Charge Justification:  (37293) THERAPEUTIC EXERCISE - Provided verbal/tactile cueing for activities related to strengthening, flexibility, endurance, ROM performed to prevent loss of range of motion, maintain or improve muscular strength or increase flexibility, following either an injury or surgery.   (64915) HOME EXERCISE PROGRAM- Reviewed/Progressed HEP activities related to strengthening, flexibility, endurance, ROM performed to prevent loss of range of motion, maintain or improve muscular strength or increase flexibility, following either an injury or surgery.  (76053) NEUROMUSCULAR RE-EDUCATION- Therapeutic procedure, 1 or more areas, each 15 minutes; neuromuscular reeducation of movement, balance, coordination, kinesthetic sense, posture, and/or proprioception for sitting and/or standing activities  (11721) MANUAL THERAPY-  Manual therapy techniques, 1 or more regions, each 15 minutes (Mobilization/manipulation, manual lymphatic drainage, manual traction) for the purpose of modulating pain, promoting relaxation,  increasing ROM, reducing/eliminating soft tissue swelling/inflammation/restriction, improving soft tissue extensibility and allowing for proper ROM for normal function with self care, mobility, lifting and ambulation  (76346) VASOPNEUMATIC    TREATMENT PLAN   Plan: Cont POC- Continue emphasis/focus on exercise progression, increasing ROM, and improving soft tissue extensibility. Next visit

## 2024-04-09 ENCOUNTER — HOSPITAL ENCOUNTER (OUTPATIENT)
Dept: PHYSICAL THERAPY | Age: 24
Setting detail: THERAPIES SERIES
Discharge: HOME | End: 2024-04-09
Payer: COMMERCIAL

## 2024-04-09 PROCEDURE — 97110 THERAPEUTIC EXERCISES: CPT

## 2024-04-09 PROCEDURE — 97140 MANUAL THERAPY 1/> REGIONS: CPT

## 2024-04-09 PROCEDURE — 97530 THERAPEUTIC ACTIVITIES: CPT

## 2024-04-09 NOTE — FLOWSHEET NOTE
[facility] - Outpatient Rehabilitation and Therapy [address] [place] [address] office: [phone] fax: [phone]          Physical Therapy Re-Certification Plan of Care/MD UPDATE        Physical Therapy: TREATMENT/PROGRESS NOTE   Patient: Jeffry Berry (23 y.o. male)   Treatment Date: 2024   :  2000 MRN: [id number]   Visit #: 20   Insurance: Payor: [facility] / Plan: [place] -  [place] / Product Type: *No Product type* /   Insurance ID: [id number] - ([facility])  Secondary Insurance (if applicable): A [employer]   Treatment Diagnosis:    No diagnosis found.    1. Left knee pain, unspecified chronicity  M25.562         2. Effusion of left knee  M25.462         3. Gait difficulty  R26.9       Medical Diagnosis:      Complete tear of anterior cruciate ligament of left knee, initial encounter [S89.193A]   Referring Physician: Tyson Woodard MD  PCP: No primary care provider on file.                             Plan of care signed (Y/N): yes    Date of Patient follow up with Physician: wk of 10/1     Progress Report/POC: NO      POC update due (10 Rx/or 30 days whichever is less OR AUTH LIMITS): POC due 3/6/2024       Visit # Insurance Allowable Auth Needed   63 [facility] []Yes    []No     Latex Allergy:  [x]NO      []YES  Preferred Language for Healthcare:   [x]English       []other:    SUBJECTIVE EXAMINATION     Patient Report/Comments: Patient reports continued improvement in left knee. Doing more in weight room and in practice.           OBJECTIVE EXAMINATION     Observation:  noted appropriate fatigue by end of session    Test measurements:           Test used Initial score 2024   Pain Summary VAS 4-5/10    Functional questionnaire LEFS 90% deficit    ROM        See eval 0-145               Strength  See eval 5/5 quad      5/5 ham                 RESTRICTIONS/PRECAUTIONS:     Exercises/Interventions:     Therapeutic Ex (97027)  resistance Sets/time Reps

## 2024-04-11 ENCOUNTER — HOSPITAL ENCOUNTER (OUTPATIENT)
Dept: PHYSICAL THERAPY | Age: 24
Setting detail: THERAPIES SERIES
Discharge: HOME | End: 2024-04-11
Payer: COMMERCIAL

## 2024-04-11 PROCEDURE — 97530 THERAPEUTIC ACTIVITIES: CPT

## 2024-04-11 PROCEDURE — 97140 MANUAL THERAPY 1/> REGIONS: CPT

## 2024-04-11 PROCEDURE — 97110 THERAPEUTIC EXERCISES: CPT

## 2024-04-11 NOTE — FLOWSHEET NOTE
[facility] - Outpatient Rehabilitation and Therapy [address] [place] [address] office: [phone] fax: [phone]          Physical Therapy Re-Certification Plan of Care/MD UPDATE        Physical Therapy: TREATMENT/PROGRESS NOTE   Patient: Jeffry Berry (23 y.o. male)   Treatment Date: 2024   :  2000 MRN: [id number]   Visit #: 20   Insurance: Payor: [facility] / Plan: [place] -  [place] / Product Type: *No Product type* /   Insurance ID: [id number] - ([facility])  Secondary Insurance (if applicable): A [facility]   Treatment Diagnosis:    No diagnosis found.    1. Left knee pain, unspecified chronicity  M25.562         2. Effusion of left knee  M25.462         3. Gait difficulty  R26.9       Medical Diagnosis:      Complete tear of anterior cruciate ligament of left knee, initial encounter [S81.911A]   Referring Physician: Tyson Woodard MD  PCP: No primary care provider on file.                             Plan of care signed (Y/N): yes    Date of Patient follow up with Physician: wk of 10/1     Progress Report/POC: NO      POC update due (10 Rx/or 30 days whichever is less OR AUTH LIMITS): POC due 3/6/2024       Visit # Insurance Allowable Auth Needed   64 [facility] []Yes    []No     Latex Allergy:  [x]NO      []YES  Preferred Language for Healthcare:   [x]English       []other:    SUBJECTIVE EXAMINATION     Patient Report/Comments: Patient states some tightness in left calf. Overall doing well.            OBJECTIVE EXAMINATION     Observation:  noted appropriate fatigue by end of session    Test measurements:           Test used Initial score 2024   Pain Summary VAS 4-5/10    Functional questionnaire LEFS 90% deficit    ROM        See eval 0-145               Strength  See eval 5/5 quad      5/5 ham                 RESTRICTIONS/PRECAUTIONS:     Exercises/Interventions:     Therapeutic Ex (69811)  resistance Sets/time Reps Notes/Cues/Progressions   bike  5'

## 2024-04-16 ENCOUNTER — HOSPITAL ENCOUNTER (OUTPATIENT)
Dept: PHYSICAL THERAPY | Age: 24
Setting detail: THERAPIES SERIES
Discharge: HOME | End: 2024-04-16
Payer: COMMERCIAL

## 2024-04-16 PROCEDURE — 97530 THERAPEUTIC ACTIVITIES: CPT

## 2024-04-16 PROCEDURE — 97110 THERAPEUTIC EXERCISES: CPT

## 2024-04-16 PROCEDURE — 97140 MANUAL THERAPY 1/> REGIONS: CPT

## 2024-04-16 NOTE — FLOWSHEET NOTE
[facility] - Outpatient Rehabilitation and Therapy [address] [place] [address] office: [phone] fax: [phone]          Physical Therapy Re-Certification Plan of Care/MD UPDATE        Physical Therapy: TREATMENT/PROGRESS NOTE   Patient: Jeffry Berry (23 y.o. male)   Treatment Date: 2024   :  2000 MRN: [id number]   Visit #: 20   Insurance: Payor: [facility] / Plan: [place] -  [place] / Product Type: *No Product type* /   Insurance ID: [id number] - ([facility])  Secondary Insurance (if applicable): A [employer]   Treatment Diagnosis:    No diagnosis found.    1. Left knee pain, unspecified chronicity  M25.562         2. Effusion of left knee  M25.462         3. Gait difficulty  R26.9       Medical Diagnosis:      Complete tear of anterior cruciate ligament of left knee, initial encounter [S82.330A]   Referring Physician: Tyson Woodard MD  PCP: No primary care provider on file.                             Plan of care signed (Y/N): yes    Date of Patient follow up with Physician: wk of 10/1     Progress Report/POC: NO      POC update due (10 Rx/or 30 days whichever is less OR AUTH LIMITS): POC due 3/6/2024       Visit # Insurance Allowable Auth Needed   65 [facility] []Yes    []No     Latex Allergy:  [x]NO      []YES  Preferred Language for Healthcare:   [x]English       []other:    SUBJECTIVE EXAMINATION     Patient Report/Comments: Patient reports overall feeling really good. Some soreness in calf and hamstring but no pain.            OBJECTIVE EXAMINATION     Observation:  noted appropriate fatigue by end of session    Test measurements:           Test used Initial score 2024   Pain Summary VAS 4-5/10    Functional questionnaire LEFS 90% deficit    ROM        See eval 0-145               Strength  See eval 5/5 quad      5/5 ham                 RESTRICTIONS/PRECAUTIONS:     Exercises/Interventions:     Therapeutic Ex (88716)  resistance Sets/time Reps

## 2024-04-18 ENCOUNTER — HOSPITAL ENCOUNTER (OUTPATIENT)
Dept: PHYSICAL THERAPY | Age: 24
Setting detail: THERAPIES SERIES
Discharge: HOME | End: 2024-04-18
Payer: COMMERCIAL

## 2024-04-18 PROCEDURE — 97110 THERAPEUTIC EXERCISES: CPT

## 2024-04-18 PROCEDURE — 97530 THERAPEUTIC ACTIVITIES: CPT

## 2024-04-18 PROCEDURE — 97140 MANUAL THERAPY 1/> REGIONS: CPT

## 2024-04-23 ENCOUNTER — HOSPITAL ENCOUNTER (OUTPATIENT)
Dept: PHYSICAL THERAPY | Age: 24
Setting detail: THERAPIES SERIES
Discharge: HOME | End: 2024-04-23
Payer: COMMERCIAL

## 2024-04-23 PROCEDURE — 97530 THERAPEUTIC ACTIVITIES: CPT

## 2024-04-23 PROCEDURE — 97140 MANUAL THERAPY 1/> REGIONS: CPT

## 2024-04-23 PROCEDURE — 97110 THERAPEUTIC EXERCISES: CPT

## 2024-04-23 NOTE — FLOWSHEET NOTE
[facility] - Outpatient Rehabilitation and Therapy [address] [place] [address] office: [phone] fax: [phone]          Physical Therapy Re-Certification Plan of Care/MD UPDATE        Physical Therapy: TREATMENT/PROGRESS NOTE   Patient: Jeffry Berry (23 y.o. male)   Treatment Date: 2024   :  2000 MRN: [id number]   Visit #: 20   Insurance: Payor: [facility] / Plan: [place] -  [place] / Product Type: *No Product type* /   Insurance ID: [id number] - ([facility])  Secondary Insurance (if applicable): A [employer]   Treatment Diagnosis:    No diagnosis found.    1. Left knee pain, unspecified chronicity  M25.562         2. Effusion of left knee  M25.462         3. Gait difficulty  R26.9       Medical Diagnosis:      Complete tear of anterior cruciate ligament of left knee, initial encounter [S81.278A]   Referring Physician: Tyson Woodard MD  PCP: No primary care provider on file.                             Plan of care signed (Y/N): yes    Date of Patient follow up with Physician: wk of 10/1     Progress Report/POC: NO      POC update due (10 Rx/or 30 days whichever is less OR AUTH LIMITS): POC due 3/6/2024       Visit # Insurance Allowable Auth Needed   67 [facility] []Yes    []No     Latex Allergy:  [x]NO      []YES  Preferred Language for Healthcare:   [x]English       []other:    SUBJECTIVE EXAMINATION     Patient Report/Comments: Patient reports feeling sore everywhere besides his knee due to workouts. Overall feeling really good with strength progression.              OBJECTIVE EXAMINATION     Observation:  noted appropriate fatigue by end of session    Test measurements:           Test used Initial score 2024   Pain Summary VAS 4-5/10    Functional questionnaire LEFS 90% deficit    ROM        See eval 0-145               Strength  See eval 5/5 quad      5/5 ham                 RESTRICTIONS/PRECAUTIONS:     Exercises/Interventions:     Therapeutic Ex

## 2024-05-02 ENCOUNTER — HOSPITAL ENCOUNTER (OUTPATIENT)
Dept: PHYSICAL THERAPY | Age: 24
Setting detail: THERAPIES SERIES
Discharge: HOME | End: 2024-05-02
Payer: COMMERCIAL

## 2024-05-02 PROCEDURE — 97140 MANUAL THERAPY 1/> REGIONS: CPT

## 2024-05-02 PROCEDURE — 97110 THERAPEUTIC EXERCISES: CPT

## 2024-05-02 PROCEDURE — 97530 THERAPEUTIC ACTIVITIES: CPT

## 2024-05-02 NOTE — FLOWSHEET NOTE
[facility] - Outpatient Rehabilitation and Therapy [address] [place] [address] office: [phone] fax: [phone]          Physical Therapy Re-Certification Plan of Care/MD UPDATE        Physical Therapy: TREATMENT/PROGRESS NOTE   Patient: Jeffry Berry (23 y.o. male)   Treatment Date: 2024   :  2000 MRN: [id number]   Visit #: 20   Insurance: Payor: [facility] / Plan: [place] -  [place] / Product Type: *No Product type* /   Insurance ID: [id number] - ([facility])  Secondary Insurance (if applicable): A [employer]   Treatment Diagnosis:    No diagnosis found.    1. Left knee pain, unspecified chronicity  M25.562         2. Effusion of left knee  M25.462         3. Gait difficulty  R26.9       Medical Diagnosis:      Complete tear of anterior cruciate ligament of left knee, initial encounter [S81.957A]   Referring Physician: Tyson Woodard MD  PCP: No primary care provider on file.                             Plan of care signed (Y/N): yes    Date of Patient follow up with Physician: wk of 10/1     Progress Report/POC: NO      POC update due (10 Rx/or 30 days whichever is less OR AUTH LIMITS): POC due 3/6/2024       Visit # Insurance Allowable Auth Needed   [facility] []Yes    []No     Latex Allergy:  [x]NO      []YES  Preferred Language for Healthcare:   [x]English       []other:    SUBJECTIVE EXAMINATION     Patient Report/Comments: Patient reports feeling sore everywhere besides his knee due to workouts. Overall feeling really good with strength progression.              OBJECTIVE EXAMINATION     Observation:  noted appropriate fatigue by end of session    Test measurements:           Test used Initial score 2024   Pain Summary VAS 4-5/10    Functional questionnaire LEFS 90% deficit    ROM        See eval 0-145               Strength  See eval 5/5 quad      5/5 ham                 RESTRICTIONS/PRECAUTIONS:     Exercises/Interventions:     Therapeutic Ex

## 2024-05-08 ENCOUNTER — HOSPITAL ENCOUNTER (OUTPATIENT)
Dept: PHYSICAL THERAPY | Age: 24
Setting detail: THERAPIES SERIES
Discharge: HOME | End: 2024-05-08
Payer: COMMERCIAL

## 2024-05-08 PROCEDURE — 97750 PHYSICAL PERFORMANCE TEST: CPT

## 2024-05-08 PROCEDURE — 97530 THERAPEUTIC ACTIVITIES: CPT

## 2024-05-08 NOTE — PLAN OF CARE
[facility] - Outpatient Rehabilitation and Therapy [address] [place] [address] office: [phone] fax: [phone]            Physical Therapy Re-Certification Plan of Care/MD UPDATE      Dear Dr. Woodard  ,    We had the pleasure of treating the following patient for physical therapy services at [facility].  A summary of our findings can be found in the updated assessment below.  This includes our plan of care.  If you have any questions or concerns regarding these findings, please do not hesitate to contact me at the office phone number checked above.  Thank you for the referral.     Physician Signature:________________________________Date:__________________  By signing above (or electronic signature), therapist’s plan is approved by physician      Current Functional Status:   Jeffry Berry 2000 continues to present with functional deficits in strength symmetry and endurance of strength  limiting ability with lifting items, pushing or pulling activity, and running, jumping and cutting  .  During therapy this date, patient required progression of exercises and program for exercise progression, reduce/eliminate soft tissue swelling/inflammation/restriction, static and dynamic balance, and kinesthetic sense and proprioception. Patient will continue to benefit from ongoing evaluation and advanced clinical decision from a Physical Therapist to improve neuromuscular control, endurance, balance and proprioception, and high level IADLs to safely return to PLOF and advanced sport activity without symptoms or restrictions.    Overall Response to Treatment:   [x]Patient is responding well to treatment and improvement is noted with regards to goals   []Patient should continue to improve in reasonable time if they continue HEP   []Patient has plateaued and is no longer responding to skilled PT intervention    []Patient is getting worse and would benefit from return

## 2024-05-10 ENCOUNTER — HOSPITAL ENCOUNTER (OUTPATIENT)
Dept: PHYSICAL THERAPY | Age: 24
Setting detail: THERAPIES SERIES
Discharge: HOME | End: 2024-05-10
Payer: COMMERCIAL

## 2024-05-10 PROCEDURE — 97530 THERAPEUTIC ACTIVITIES: CPT

## 2024-05-10 PROCEDURE — 97110 THERAPEUTIC EXERCISES: CPT

## 2024-05-10 NOTE — FLOWSHEET NOTE
[facility] - Outpatient Rehabilitation and Therapy [address] [place] [address] office: [phone] fax: [phone]          Physical Therapy: TREATMENT/PROGRESS NOTE   Patient: Jeffry Berry (23 y.o. male)   Treatment Date: 05/10/2024   :  2000 MRN: [id number]   Visit #: 20   Insurance: Payor: [employer] / Plan: [employer] -  [place] / Product Type: *No Product type* /   Insurance ID: [id number] - ([facility])  Secondary Insurance (if applicable): [employer]   Treatment Diagnosis:    No diagnosis found.    1. Left knee pain, unspecified chronicity  M25.562         2. Effusion of left knee  M25.462         3. Gait difficulty  R26.9       Medical Diagnosis:      Complete tear of anterior cruciate ligament of left knee, initial encounter [S83.512A]   Referring Physician: Tyson Woodard MD  PCP: No primary care provider on file.                             Plan of care signed (Y/N): yes    Date of Patient follow up with Physician: wk of 10/1     Progress Report/POC: YES, Date Range for this report: 2023 to 2024       POC update due (10 Rx/or 30 days whichever is less OR AUTH LIMITS): POC due 3/6/2024       Visit # Insurance Allowable Auth Needed   [facility] []Yes    []No     Latex Allergy:  [x]NO      []YES  Preferred Language for Healthcare:   [x]English       []other:    SUBJECTIVE EXAMINATION     Patient Report/Comments: Patient reports that his knee felt ok after strength testing last visit. No new complaints.           OBJECTIVE EXAMINATION     Observation:  noted appropriate fatigue by end of session    Test measurements:    Isokinetic Test Results:  (see media for scanned results)  Bilateral Difference:  Quadricep 60 deg/sec: 20% [x] Deficit   [] Surplus 180 deg/sec: 12% [x] Deficit   [] Surplus   Hamstring 60 deg/sec: 15% [] Deficit   [x] Surplus 180 deg/sec: 15% [] Deficit   [x] Surplus      Normative Data, 60 degrees/second:  Quadricep Normal: >85% Patient:

## 2024-05-29 ENCOUNTER — OFFICE VISIT (OUTPATIENT)
Dept: ORTHOPEDIC SURGERY | Age: 24
End: 2024-05-29
Payer: COMMERCIAL

## 2024-05-29 VITALS — WEIGHT: 250 LBS | HEIGHT: 77 IN | BODY MASS INDEX: 29.52 KG/M2

## 2024-05-29 DIAGNOSIS — Z98.890 S/P ACL RECONSTRUCTION: Primary | ICD-10-CM

## 2024-05-29 PROCEDURE — 99213 OFFICE O/P EST LOW 20 MIN: CPT | Performed by: ORTHOPAEDIC SURGERY

## 2024-05-29 NOTE — PROGRESS NOTES
Chief Complaint  Follow-up (Left knee. S/p acl )      History of Present Illness:  Jeffry Berry is a pleasant 23 y.o. male who returns for follow-up 8.5 months status post left ACL reconstruction with BTB autograft with MCL repair and partial lateral meniscectomy performed by Dr. Woodard on 9/12/2023.  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  He states he is doing well and has no complaints at this time.  He has been doing straight line running and strengthening and has had no issues with his reconstructed knee.  He is hoping to be cleared with no restriction after today's visit.      Medical History:  Patient's medications, allergies, past medical, surgical, social and family histories were reviewed and updated as appropriate.    Pain Assessment  Location of Pain: Knee  Location Modifiers: Left  Severity of Pain: 0  Quality of Pain:  (n/a)  Duration of Pain:  (n/a)  Frequency of Pain:  (n/a)  Aggravating Factors:  (no aggravating factors)  Limiting Behavior: Yes  Relieving Factors: Rest  Result of Injury: Yes  Work-Related Injury: No  Are there other pain locations you wish to document?: No  ROS: Review of systems reviewed from Patient History Form completed today and available in the patient's chart under the Media tab.      Pertinent items are noted in HPI  Review of systems reviewed from Patient History Form completed today and available in the patient's chart under the Media tab.       Vital Signs:  Ht 1.956 m (6' 5\")   Wt 113.4 kg (250 lb)   BMI 29.65 kg/m²       Left knee examination:    Gait: No use of assistive devices. No antalgic gait.    Alignment: normal alignment.    Inspection/skin: Incisions well-healed without surrounding erythema or warmth.  Skin is intact without erythema or ecchymosis. No gross deformity.     Palpation: Moderate crepitus. No joint line tenderness present.    Range of Motion: There is full range of motion.     Strength: Normal quadriceps development.

## 2024-06-12 ENCOUNTER — HOSPITAL ENCOUNTER (OUTPATIENT)
Dept: PHYSICAL THERAPY | Age: 24
Setting detail: THERAPIES SERIES
Discharge: HOME | End: 2024-06-12
Payer: COMMERCIAL

## 2024-06-12 PROCEDURE — 97140 MANUAL THERAPY 1/> REGIONS: CPT

## 2024-06-12 PROCEDURE — 97110 THERAPEUTIC EXERCISES: CPT

## 2024-06-12 PROCEDURE — 97530 THERAPEUTIC ACTIVITIES: CPT

## 2024-06-17 ENCOUNTER — APPOINTMENT (OUTPATIENT)
Dept: PHYSICAL THERAPY | Age: 24
End: 2024-06-17
Payer: COMMERCIAL

## 2024-06-19 ENCOUNTER — HOSPITAL ENCOUNTER (OUTPATIENT)
Dept: PHYSICAL THERAPY | Age: 24
Setting detail: THERAPIES SERIES
Discharge: HOME | End: 2024-06-19
Payer: COMMERCIAL

## 2024-06-19 PROCEDURE — 97530 THERAPEUTIC ACTIVITIES: CPT

## 2024-06-19 PROCEDURE — 97110 THERAPEUTIC EXERCISES: CPT

## 2024-06-19 PROCEDURE — 97140 MANUAL THERAPY 1/> REGIONS: CPT

## 2024-06-19 NOTE — FLOWSHEET NOTE
[facility] - Outpatient Rehabilitation and Therapy [address] [place] [address] office: [phone] fax: [phone]        Physical Therapy: TREATMENT/PROGRESS NOTE   Patient: Jeffry Berry (23 y.o. male)   Treatment Date: 2024   :  2000 MRN: [id number]   Visit #: 20   Insurance: Payor: [employer] / [place] [employer] -  [place] / Product Type: *No Product type* /   Insurance ID: [id number] - ([facility])  Secondary Insurance (if applicable): A INPHI   Treatment Diagnosis:    No diagnosis found.    1. Left knee pain, unspecified chronicity  M25.562         2. Effusion of left knee  M25.462         3. Gait difficulty  R26.9       Medical Diagnosis:      Complete tear of anterior cruciate ligament of left knee, initial encounter [S83.512A]   Referring Physician: Tyson Woodard MD  PCP: No primary care provider on file.                             Plan of care signed (Y/N): yes    Date of Patient follow up with Physician: wk of 10/1     Progress Report/POC: NO      POC update due (10 Rx/or 30 days whichever is less OR AUTH LIMITS): POC due 3/6/2024       Visit # Insurance Allowable Auth Needed   [facility] []Yes    []No     Latex Allergy:  [x]NO      []YES  Preferred Language for Healthcare:   [x]English       []other:    SUBJECTIVE EXAMINATION     Patient Report/Comments: Patient reports soreness in back of knee and leg.      Test used Initial score 2024   Pain Summary VAS 4-5/10 3/10   Functional questionnaire LEFS 90% deficit    ROM        See eval 0-145               Strength  See eval 5/5 quad      5/5 ham                   OBJECTIVE EXAMINATION     Observation:  noted appropriate fatigue by end of session    Test measurements:    Isokinetic Test Results:  (see media for scanned results)  Bilateral Difference:  Quadricep 60 deg/sec: 20% [x] Deficit   [] Surplus 180 deg/sec: 12% [x] Deficit   [] Surplus   Hamstring 60 deg/sec: 15% [] Deficit   [x] Surplus 180

## 2024-06-26 ENCOUNTER — APPOINTMENT (OUTPATIENT)
Dept: PHYSICAL THERAPY | Age: 24
End: 2024-06-26
Payer: COMMERCIAL

## 2024-06-28 ENCOUNTER — APPOINTMENT (OUTPATIENT)
Dept: PHYSICAL THERAPY | Age: 24
End: 2024-06-28
Payer: COMMERCIAL

## 2024-07-11 ENCOUNTER — HOSPITAL ENCOUNTER (OUTPATIENT)
Dept: PHYSICAL THERAPY | Age: 24
Setting detail: THERAPIES SERIES
Discharge: HOME | End: 2024-07-11
Payer: COMMERCIAL

## 2024-07-11 PROCEDURE — 97140 MANUAL THERAPY 1/> REGIONS: CPT

## 2024-07-11 PROCEDURE — 97110 THERAPEUTIC EXERCISES: CPT

## 2024-07-11 NOTE — FLOWSHEET NOTE
[facility] - Outpatient Rehabilitation and Therapy [address] [place] [address] office: [phone] fax: [phone]        Physical Therapy: TREATMENT/PROGRESS NOTE   Patient: Jeffry Berry (23 y.o. male)   Treatment Date: 2024   :  2000 MRN: [id number]   Visit #: 20   Insurance: Payor: [employer] / [place] [employer] -  [place] / Product Type: *No Product type* /   Insurance ID: [id number] - ([facility])  Secondary Insurance (if applicable): A [employer]   Treatment Diagnosis:    No diagnosis found.    1. Left knee pain, unspecified chronicity  M25.562         2. Effusion of left knee  M25.462         3. Gait difficulty  R26.9       Medical Diagnosis:      Complete tear of anterior cruciate ligament of left knee, initial encounter [S83.512A]   Referring Physician: Tyson Woodard MD  PCP: No primary care provider on file.                             Plan of care signed (Y/N): yes    Date of Patient follow up with Physician: wk of 10/1     Progress Report/POC: NO      POC update due (10 Rx/or 30 days whichever is less OR AUTH LIMITS): POC due 3/6/2024       Visit # Insurance Allowable Auth Needed   [facility] []Yes    []No     Latex Allergy:  [x]NO      []YES  Preferred Language for Healthcare:   [x]English       []other:    SUBJECTIVE EXAMINATION     Patient Report/Comments: Patient presents back to clinic after having an episode of pain in the back of left knee during running shuttles at football. He states that he felt a pop in the back of the knee and had some soreness. Seen my MD who thought he tweaked his hamstring a bit and should rest for a week. Notes that today it already feels much better.      Test used Initial score 2024   Pain Summary VAS 4-5/10 310   Functional questionnaire LEFS 90% deficit    ROM        See eval 0-145               Strength  See eval 5/5 quad      5/5 ham                   OBJECTIVE EXAMINATION     Observation:  no real tenderness to

## 2024-07-15 ENCOUNTER — OFFICE VISIT (OUTPATIENT)
Dept: ORTHOPEDIC SURGERY | Age: 24
End: 2024-07-15
Payer: COMMERCIAL

## 2024-07-15 VITALS — WEIGHT: 250 LBS | BODY MASS INDEX: 29.52 KG/M2 | HEIGHT: 77 IN

## 2024-07-15 DIAGNOSIS — M25.562 LEFT KNEE PAIN, UNSPECIFIED CHRONICITY: ICD-10-CM

## 2024-07-15 DIAGNOSIS — Z47.89 ORTHOPEDIC AFTERCARE: Primary | ICD-10-CM

## 2024-07-15 DIAGNOSIS — Z98.890 S/P ACL RECONSTRUCTION: ICD-10-CM

## 2024-07-15 DIAGNOSIS — Z98.890 S/P MCL REPAIR: ICD-10-CM

## 2024-07-15 PROCEDURE — 99213 OFFICE O/P EST LOW 20 MIN: CPT | Performed by: STUDENT IN AN ORGANIZED HEALTH CARE EDUCATION/TRAINING PROGRAM

## 2024-07-15 NOTE — PROGRESS NOTES
Observation/Bedded Outpatient  Occupational Therapy Plan of Care Note  Primary Insurance: MEDICARE    Secondary Insurance: [employer]    Note sent for required physician co-signature: Yes         Is the patient currently receiving skilled home care services (RN or therapy)?treatment           Diagnosis:   1. Orthostatic hypotension    2. Near syncope        Therapy Diagnosis: near syncope    Assessment:  Pt seen for OT treatement on Med Surg 1. Emphasis of session included bed mobility, sit to stand transfer edge of bed, transfer to chair, standing balance and proper use of WW for safety with transfers, sponge bathing, dressing and grooming from chair.    Co morbidities:   Patient Active Problem List   Diagnosis   • Spermatocele   • Hypertrophy of prostate without urinary obstruction and other lower urinary tract symptoms (LUTS)   • Essential hypertension, benign   • Carpal tunnel syndrome   • Lumbosacral spondylosis without myelopathy   • Other and unspecified hyperlipidemia   • Brachial neuritis or radiculitis NOS   • CANCER OF SKIN   comment   • Degeneration of cervical intervertebral disc   • Cervical spondylosis without myelopathy   • Spinal stenosis, lumbar region, without neurogenic claudication   • Spinal stenosis in cervical region   • Unspecified hypothyroidism   • Stiffness of joint, not elsewhere classified,  shoulder region   • Carpal tunnel syndrome   • OSTEOARTHROS NOS-L/LEG-severe L>R, min Sx   • POPLITEAL SYNOVIAL CYST-L   • KNEE JOINT REPLACEMENT STATUS-L,11/3/08   • Diverticulosis of colon (without mention of hemorrhage)   • Reflux esophagitis   • Lumbago   • Vasomotor rhinitis   • Aortic stenosis, mild   • Appendicitis with perforation   • DVT prophylaxis   • Anemia, unspecified   • CLL (chronic lymphocytic leukemia) (CMS/HCC)   • Generalized weakness   • Decreased strength   • Verruca plantaris   • Mild depression (CMS/HCC)   • Frailty   • Orthostatic hypotension   • Near syncope       OT Task  Modification: Minimal to moderate task modification    Precautions:      Functional Status: (as of date/time noted)  Self Cares/ Activities of daily living:  Grooming Assistance: Set-up;Clean-up;Chair (06/02/17 1008)  Oral Hygiene Assistance: Set-up;Clean-up;Chair (06/02/17 1008)  Grooming/Oral Hygiene Deficit: Setup;Wash/dry hands;Wash/dry face;Teeth care;Brushing hair (06/02/17 1008)  Bathing Assistance: Set-up;Clean-up;Chair (06/02/17 1008)  Bathing Deficit: Setup (pt washes upper body and to ankles seated in chair.) (06/02/17 1008)  Upper Body Dressing Assistance: Moderate Assist (Mod) (to don/doff gown around telemetry and tie in back) (06/02/17 1008)  Upper Body Dressing Deficit: Thread RUE;Thread LUE;Pull around back;Fasteners (06/02/17 1008)  Self Cares/ADL's Comments #1: pt denies need for toileting, changing Depends and washing feet. Pt reports that Depends were changed earlier today and Depends appear to be unsoiled during session. (06/02/17 1008)    Household Mobility:  Supine to Sit: Supervision (verbal cuing) (06/02/17 1008)  Sit to Stand: Supervision (cues for hand placement, posterior lean noted) (06/02/17 1008)  Stand to Sit: Supervision (cues for hand placement, reaching back to chair) (06/02/17 1008)  Stand Pivot Transfers: Minimal Assist (Min) (for balance using WW) (06/02/17 1008)  Transfer Equipment: gait belt, WW (06/02/17 1008)  Sitting - Static: Independent (06/02/17 1008)  Sitting - Dynamic: Independent (06/02/17 1008)  Standing - Static: Supervision (WW for support, posterior lean initially) (06/02/17 1008)  Standing - Dynamic: Minimal Assist (Min) (for safety with balance) (06/02/17 1008)  Household Mobility Comments #1: Pt completed bed to chair transfer using WW and taking a few steps left with overall min assist for balance/safety, hand placement cues and WW safety use. (06/02/17 1008)    Home Management Skills:  Home Management Skills Comments: All items set-up/clean-up for ADLs this  personally with the patient, performed the described exam, reviewed the radiographic studies and medical records associated with this patient and supervised the services that are described above.     Tyson Woodard MD     session. (06/02/17 4343)    See OT (occupational therapy) flowsheet for full details regarding the OT provided.    Education: On this date, the patient was educated on role of OT, progression of cares and safety with transfers, proper use of WW for transfers and functional ambulation, standing balance.   The response to education was: Verbalizes understanding, Demonstrates understanding and Needs reinforcement    Barriers to Discharge: balance/posterior lean, activity tolerance, strength    Long Term Treatment Goals:  Feeding Discharge Goal:    Grooming Discharge Goal:    Bathing Discharge Goal:    Dressing Discharge Goal:    Toileting Discharge Goal:    Home Setting Transfer Discharge Goal:    Home Management Discharge Goal:    Upper Extremity Function Discharge Goal:    Other Discharge Goal:    Other Discharge Goal:      Treatment Interventions: ADL retraining, Functional transfer training, Endurance training  Amount:  0-30 minutes  Frequency:  7 days/week  Duration: 5 days or more    Plan for Next Session:      Recommendations for Discharge: Sub-acute nursing home    Billing Information:    Timed Procedures:   $ ADL/Self Care : 8-22 mins,  ,  ,  ,  ,  ,  ,  ,  ,  ,  , $ Therapy Activities per 15 min: 8-22 mins,  ,  ,  ,      Untimed Procedures:   ,  ,  ,  ,  ,  ,  ,  ,      G-Codes:   ,  ,  ,  ,  ,  ,  ,  ,  ,  ,  ,  ,  ,  ,  ,  ,  ,      Total Treatment Time:  OT Time Spent: 30 minutes    Timed Treatment Minutes:  OBS Patients Only:  OT Timed Code TX Minutes: 30 minutes    The co-signature indicates that the physician certifies the need for OT furnished under this plan of treatment while under his/her care.

## 2024-07-17 ENCOUNTER — PREP FOR PROCEDURE (OUTPATIENT)
Dept: ORTHOPEDIC SURGERY | Age: 24
End: 2024-07-17

## 2024-07-17 ENCOUNTER — HOSPITAL ENCOUNTER (OUTPATIENT)
Dept: PHYSICAL THERAPY | Age: 24
Setting detail: THERAPIES SERIES
Discharge: HOME | End: 2024-07-17
Payer: COMMERCIAL

## 2024-07-17 DIAGNOSIS — M25.862 CYCLOPS LESION OF LEFT KNEE: ICD-10-CM

## 2024-07-17 DIAGNOSIS — M25.862 CYCLOPS LESION OF LEFT KNEE: Primary | ICD-10-CM

## 2024-07-17 PROCEDURE — 97110 THERAPEUTIC EXERCISES: CPT

## 2024-07-17 PROCEDURE — 97032 APPL MODALITY 1+ESTIM EA 15: CPT

## 2024-07-17 PROCEDURE — 97140 MANUAL THERAPY 1/> REGIONS: CPT

## 2024-07-17 PROCEDURE — 20560 NDL INSJ W/O NJX 1 OR 2 MUSC: CPT

## 2024-07-17 NOTE — FLOWSHEET NOTE
[facility] - Outpatient Rehabilitation and Therapy [address] [place] [address] office: [phone] fax: [phone]        Physical Therapy: TREATMENT/PROGRESS NOTE   Patient: Jeffry Berry (23 y.o. male)   Treatment Date: 2024   :  2000 MRN: [id number]   Visit #: 20   Insurance: Payor: [employer] / Plan: [employer] -  [place] / Product Type: *No Product type* /   Insurance ID: [id number] - ([facility])  Secondary Insurance (if applicable): [employer] (Social Influence Quotient)   Treatment Diagnosis:    No diagnosis found.    1. Left knee pain, unspecified chronicity  M25.562         2. Effusion of left knee  M25.462         3. Gait difficulty  R26.9       Medical Diagnosis:      Complete tear of anterior cruciate ligament of left knee, initial encounter [S83.512A]   Referring Physician: Tyson Woodard MD  PCP: No primary care provider on file.                             Plan of care signed (Y/N): yes    Date of Patient follow up with Physician: wk of 10/1     Progress Report/POC: NO      POC update due (10 Rx/or 30 days whichever is less OR AUTH LIMITS): POC due 3/6/2024       Visit # Insurance Allowable Auth Needed   [facility] []Yes    []No     Latex Allergy:  [x]NO      []YES  Preferred Language for Healthcare:   [x]English       []other:    SUBJECTIVE EXAMINATION     Patient Report/Comments: Patient states he got confirmation he has a cyclops lesion and is getting a knee scope next week. States he does still have some soreness in his hamstring, asks about needling.     Test used Initial score 2024   Pain Summary VAS 4-5/10 310   Functional questionnaire LEFS 90% deficit    ROM        See eval 0-145               Strength  See eval 5/5 quad      5/5 ham                   OBJECTIVE EXAMINATION     Observation:  no real tenderness to palpate the posterior left knee, some slight tightness noted    Test measurements:    Isokinetic Test Results:  (see media for scanned results)  Bilateral

## 2024-07-18 NOTE — PROGRESS NOTES
[facility] PRE-SURGICAL TESTING INSTRUCTIONS                      PRIOR TO PROCEDURE DATE:    1. PLEASE FOLLOW ANY INSTRUCTIONS GIVEN TO YOU PER YOUR SURGEON.      2. Arrange for someone to drive you home and be with you for the first 24 hours after discharge for your safety after your procedure for which you received sedation. Ensure it is someone we can share information with regarding your discharge.     NOTE: At this time ONLY 2 ADULTS may accompany you   One person ENCOURAGED to stay at hospital entire time if outpatient surgery      3. You must contact your surgeon for instructions IF:  You are taking any blood thinners, aspirin, anti-inflammatory or vitamins.  There is a change in your physical condition such as a cold, fever, rash, cuts, sores, or any other infection, especially near your surgical site.    4. Do not drink alcohol the day before or day of your procedure.  Do not use any recreational marijuana at least 24 hours or street drugs (heroin, cocaine) at minimum 5 days prior to your procedure.     5. A Pre-Surgical History and Physical MUST be completed WITHIN 30 DAYS OR LESS prior to your procedure.by your Physician or an Urgent Care        THE DAY OF YOUR PROCEDURE:  1.  Follow instructions for ARRIVAL TIME as DIRECTED BY YOUR SURGEON.     2. Enter the MAIN entrance from [facility] and follow the signs to the free Parking Garage or  Parking (offered free of charge 7 am-5pm).      3. Enter the Main Entrance of the hospital (do not enter from the lower level of the parking garage). Upon entrance, check in with the  at the surgical information desk on your LEFT.   Bring your insurance card and photo ID to register      4. DO NOT EAT ANYTHING 8 hours prior to arrival for surgery.  You may have up to 8 ounces of water 4 hours prior to your arrival for surgery.   NOTE: ALL Gastric, Bariatric & Bowel surgery patients - you MUST follow your surgeon's instructions regarding

## 2024-07-19 ENCOUNTER — OFFICE VISIT (OUTPATIENT)
Dept: ORTHOPEDIC SURGERY | Age: 24
End: 2024-07-19
Payer: COMMERCIAL

## 2024-07-19 VITALS — BODY MASS INDEX: 30.11 KG/M2 | HEIGHT: 77 IN | WEIGHT: 255 LBS

## 2024-07-19 DIAGNOSIS — M25.862 CYCLOPS LESION OF LEFT KNEE: Primary | ICD-10-CM

## 2024-07-19 PROCEDURE — 99213 OFFICE O/P EST LOW 20 MIN: CPT | Performed by: ORTHOPAEDIC SURGERY

## 2024-07-19 NOTE — PROGRESS NOTES
yin Brisenoiron is in agreement with this plan. All questions were answered to patient's satisfaction and was encouraged to call with any further questions.     Andre Marquez MD  [facility]      This dictation was performed with a verbal recognition program (DRAGON) and it was checked for errors.  It is possible that there are still dictated errors within this office note.  If so, please bring any areas to my attention for an addendum.  All efforts were made to ensure that this office note is accurate.      I attest that I met personally with the patient, performed the described exam, reviewed the radiographic studies and medical records associated with this patient and supervised the services that are described above.     Tyson Woodard MD

## 2024-07-22 ENCOUNTER — ANESTHESIA EVENT (OUTPATIENT)
Dept: OPERATING ROOM | Age: 24
End: 2024-07-22
Payer: COMMERCIAL

## 2024-07-23 ENCOUNTER — HOSPITAL ENCOUNTER (OUTPATIENT)
Age: 24
Setting detail: OUTPATIENT SURGERY
Discharge: HOME | End: 2024-07-23
Attending: ORTHOPAEDIC SURGERY | Admitting: ORTHOPAEDIC SURGERY
Payer: COMMERCIAL

## 2024-07-23 ENCOUNTER — ANESTHESIA (OUTPATIENT)
Dept: OPERATING ROOM | Age: 24
End: 2024-07-23
Payer: COMMERCIAL

## 2024-07-23 VITALS
SYSTOLIC BLOOD PRESSURE: 141 MMHG | TEMPERATURE: 97.2 F | WEIGHT: 260 LBS | DIASTOLIC BLOOD PRESSURE: 81 MMHG | HEART RATE: 59 BPM | BODY MASS INDEX: 30.7 KG/M2 | RESPIRATION RATE: 14 BRPM | OXYGEN SATURATION: 98 % | HEIGHT: 77 IN

## 2024-07-23 LAB
GLUCOSE BLD-MCNC: 108 MG/DL (ref 70–99)
PERFORMED ON: ABNORMAL

## 2024-07-23 PROCEDURE — 2580000003 HC RX 258: Performed by: ORTHOPAEDIC SURGERY

## 2024-07-23 PROCEDURE — 7100000000 HC PACU RECOVERY - FIRST 15 MIN: Performed by: ORTHOPAEDIC SURGERY

## 2024-07-23 PROCEDURE — 6360000002 HC RX W HCPCS: Performed by: ORTHOPAEDIC SURGERY

## 2024-07-23 PROCEDURE — 6360000002 HC RX W HCPCS: Performed by: NURSE ANESTHETIST, CERTIFIED REGISTERED

## 2024-07-23 PROCEDURE — 3700000000 HC ANESTHESIA ATTENDED CARE: Performed by: ORTHOPAEDIC SURGERY

## 2024-07-23 PROCEDURE — 3600000004 HC SURGERY LEVEL 4 BASE: Performed by: ORTHOPAEDIC SURGERY

## 2024-07-23 PROCEDURE — 7100000010 HC PHASE II RECOVERY - FIRST 15 MIN: Performed by: ORTHOPAEDIC SURGERY

## 2024-07-23 PROCEDURE — 7100000001 HC PACU RECOVERY - ADDTL 15 MIN: Performed by: ORTHOPAEDIC SURGERY

## 2024-07-23 PROCEDURE — 2580000003 HC RX 258: Performed by: ANESTHESIOLOGY

## 2024-07-23 PROCEDURE — 3600000014 HC SURGERY LEVEL 4 ADDTL 15MIN: Performed by: ORTHOPAEDIC SURGERY

## 2024-07-23 PROCEDURE — 2720000010 HC SURG SUPPLY STERILE: Performed by: ORTHOPAEDIC SURGERY

## 2024-07-23 PROCEDURE — 2709999900 HC NON-CHARGEABLE SUPPLY: Performed by: ORTHOPAEDIC SURGERY

## 2024-07-23 PROCEDURE — 3700000001 HC ADD 15 MINUTES (ANESTHESIA): Performed by: ORTHOPAEDIC SURGERY

## 2024-07-23 PROCEDURE — 7100000011 HC PHASE II RECOVERY - ADDTL 15 MIN: Performed by: ORTHOPAEDIC SURGERY

## 2024-07-23 PROCEDURE — 2500000003 HC RX 250 WO HCPCS: Performed by: NURSE ANESTHETIST, CERTIFIED REGISTERED

## 2024-07-23 RX ORDER — HYDROMORPHONE HYDROCHLORIDE 1 MG/ML
0.5 INJECTION, SOLUTION INTRAMUSCULAR; INTRAVENOUS; SUBCUTANEOUS EVERY 5 MIN PRN
Status: DISCONTINUED | OUTPATIENT
Start: 2024-07-23 | End: 2024-07-23 | Stop reason: HOSPADM

## 2024-07-23 RX ORDER — SODIUM CHLORIDE 0.9 % (FLUSH) 0.9 %
5-40 SYRINGE (ML) INJECTION PRN
Status: DISCONTINUED | OUTPATIENT
Start: 2024-07-23 | End: 2024-07-23 | Stop reason: HOSPADM

## 2024-07-23 RX ORDER — SODIUM CHLORIDE 9 MG/ML
INJECTION, SOLUTION INTRAVENOUS PRN
Status: DISCONTINUED | OUTPATIENT
Start: 2024-07-23 | End: 2024-07-23 | Stop reason: HOSPADM

## 2024-07-23 RX ORDER — NALOXONE HYDROCHLORIDE 0.4 MG/ML
INJECTION, SOLUTION INTRAMUSCULAR; INTRAVENOUS; SUBCUTANEOUS PRN
Status: DISCONTINUED | OUTPATIENT
Start: 2024-07-23 | End: 2024-07-23 | Stop reason: HOSPADM

## 2024-07-23 RX ORDER — DEXAMETHASONE SODIUM PHOSPHATE 4 MG/ML
INJECTION, SOLUTION INTRA-ARTICULAR; INTRALESIONAL; INTRAMUSCULAR; INTRAVENOUS; SOFT TISSUE PRN
Status: DISCONTINUED | OUTPATIENT
Start: 2024-07-23 | End: 2024-07-23 | Stop reason: SDUPTHER

## 2024-07-23 RX ORDER — ROPIVACAINE HYDROCHLORIDE 5 MG/ML
INJECTION, SOLUTION EPIDURAL; INFILTRATION; PERINEURAL PRN
Status: DISCONTINUED | OUTPATIENT
Start: 2024-07-23 | End: 2024-07-23 | Stop reason: HOSPADM

## 2024-07-23 RX ORDER — OXYCODONE HYDROCHLORIDE 5 MG/1
5 TABLET ORAL PRN
Status: DISCONTINUED | OUTPATIENT
Start: 2024-07-23 | End: 2024-07-23 | Stop reason: HOSPADM

## 2024-07-23 RX ORDER — FENTANYL CITRATE 50 UG/ML
INJECTION, SOLUTION INTRAMUSCULAR; INTRAVENOUS PRN
Status: DISCONTINUED | OUTPATIENT
Start: 2024-07-23 | End: 2024-07-23 | Stop reason: SDUPTHER

## 2024-07-23 RX ORDER — LIDOCAINE HYDROCHLORIDE 20 MG/ML
INJECTION, SOLUTION INTRAVENOUS PRN
Status: DISCONTINUED | OUTPATIENT
Start: 2024-07-23 | End: 2024-07-23 | Stop reason: SDUPTHER

## 2024-07-23 RX ORDER — MIDAZOLAM HYDROCHLORIDE 1 MG/ML
INJECTION INTRAMUSCULAR; INTRAVENOUS PRN
Status: DISCONTINUED | OUTPATIENT
Start: 2024-07-23 | End: 2024-07-23 | Stop reason: SDUPTHER

## 2024-07-23 RX ORDER — PROPOFOL 10 MG/ML
INJECTION, EMULSION INTRAVENOUS PRN
Status: DISCONTINUED | OUTPATIENT
Start: 2024-07-23 | End: 2024-07-23 | Stop reason: SDUPTHER

## 2024-07-23 RX ORDER — HYDROMORPHONE HYDROCHLORIDE 2 MG/ML
INJECTION, SOLUTION INTRAMUSCULAR; INTRAVENOUS; SUBCUTANEOUS PRN
Status: DISCONTINUED | OUTPATIENT
Start: 2024-07-23 | End: 2024-07-23 | Stop reason: SDUPTHER

## 2024-07-23 RX ORDER — PROCHLORPERAZINE EDISYLATE 5 MG/ML
5 INJECTION INTRAMUSCULAR; INTRAVENOUS
Status: DISCONTINUED | OUTPATIENT
Start: 2024-07-23 | End: 2024-07-23 | Stop reason: HOSPADM

## 2024-07-23 RX ORDER — DEXMEDETOMIDINE HYDROCHLORIDE 100 UG/ML
INJECTION, SOLUTION INTRAVENOUS PRN
Status: DISCONTINUED | OUTPATIENT
Start: 2024-07-23 | End: 2024-07-23 | Stop reason: SDUPTHER

## 2024-07-23 RX ORDER — ONDANSETRON 2 MG/ML
INJECTION INTRAMUSCULAR; INTRAVENOUS PRN
Status: DISCONTINUED | OUTPATIENT
Start: 2024-07-23 | End: 2024-07-23 | Stop reason: SDUPTHER

## 2024-07-23 RX ORDER — ROCURONIUM BROMIDE 10 MG/ML
INJECTION, SOLUTION INTRAVENOUS PRN
Status: DISCONTINUED | OUTPATIENT
Start: 2024-07-23 | End: 2024-07-23 | Stop reason: SDUPTHER

## 2024-07-23 RX ORDER — FAMOTIDINE 10 MG/ML
INJECTION, SOLUTION INTRAVENOUS PRN
Status: DISCONTINUED | OUTPATIENT
Start: 2024-07-23 | End: 2024-07-23 | Stop reason: SDUPTHER

## 2024-07-23 RX ORDER — OXYCODONE HYDROCHLORIDE 5 MG/1
10 TABLET ORAL PRN
Status: DISCONTINUED | OUTPATIENT
Start: 2024-07-23 | End: 2024-07-23 | Stop reason: HOSPADM

## 2024-07-23 RX ORDER — KETOROLAC TROMETHAMINE 30 MG/ML
INJECTION, SOLUTION INTRAMUSCULAR; INTRAVENOUS PRN
Status: DISCONTINUED | OUTPATIENT
Start: 2024-07-23 | End: 2024-07-23 | Stop reason: SDUPTHER

## 2024-07-23 RX ORDER — FENTANYL CITRATE 50 UG/ML
25 INJECTION, SOLUTION INTRAMUSCULAR; INTRAVENOUS EVERY 5 MIN PRN
Status: DISCONTINUED | OUTPATIENT
Start: 2024-07-23 | End: 2024-07-23 | Stop reason: HOSPADM

## 2024-07-23 RX ORDER — SODIUM CHLORIDE, SODIUM LACTATE, POTASSIUM CHLORIDE, CALCIUM CHLORIDE 600; 310; 30; 20 MG/100ML; MG/100ML; MG/100ML; MG/100ML
INJECTION, SOLUTION INTRAVENOUS CONTINUOUS
Status: DISCONTINUED | OUTPATIENT
Start: 2024-07-23 | End: 2024-07-23 | Stop reason: HOSPADM

## 2024-07-23 RX ORDER — LABETALOL HYDROCHLORIDE 5 MG/ML
10 INJECTION, SOLUTION INTRAVENOUS
Status: DISCONTINUED | OUTPATIENT
Start: 2024-07-23 | End: 2024-07-23 | Stop reason: HOSPADM

## 2024-07-23 RX ORDER — SODIUM CHLORIDE 0.9 % (FLUSH) 0.9 %
5-40 SYRINGE (ML) INJECTION EVERY 12 HOURS SCHEDULED
Status: DISCONTINUED | OUTPATIENT
Start: 2024-07-23 | End: 2024-07-23 | Stop reason: HOSPADM

## 2024-07-23 RX ADMIN — DEXMEDETOMIDINE HYDROCHLORIDE 6 MCG: 100 INJECTION, SOLUTION INTRAVENOUS at 08:39

## 2024-07-23 RX ADMIN — DEXAMETHASONE SODIUM PHOSPHATE 4 MG: 4 INJECTION INTRA-ARTICULAR; INTRALESIONAL; INTRAMUSCULAR; INTRAVENOUS; SOFT TISSUE at 08:01

## 2024-07-23 RX ADMIN — SODIUM CHLORIDE, POTASSIUM CHLORIDE, SODIUM LACTATE AND CALCIUM CHLORIDE: 600; 310; 30; 20 INJECTION, SOLUTION INTRAVENOUS at 08:06

## 2024-07-23 RX ADMIN — FENTANYL CITRATE 100 MCG: 50 INJECTION, SOLUTION INTRAMUSCULAR; INTRAVENOUS at 07:42

## 2024-07-23 RX ADMIN — ONDANSETRON 4 MG: 2 INJECTION INTRAMUSCULAR; INTRAVENOUS at 07:42

## 2024-07-23 RX ADMIN — MIDAZOLAM HYDROCHLORIDE 2 MG: 2 INJECTION, SOLUTION INTRAMUSCULAR; INTRAVENOUS at 07:33

## 2024-07-23 RX ADMIN — SUGAMMADEX 300 MG: 100 INJECTION, SOLUTION INTRAVENOUS at 08:26

## 2024-07-23 RX ADMIN — PROPOFOL 200 MG: 10 INJECTION, EMULSION INTRAVENOUS at 07:42

## 2024-07-23 RX ADMIN — FAMOTIDINE 20 MG: 10 INJECTION, SOLUTION INTRAVENOUS at 08:01

## 2024-07-23 RX ADMIN — HYDROMORPHONE HYDROCHLORIDE 0.5 MG: 2 INJECTION, SOLUTION INTRAMUSCULAR; INTRAVENOUS; SUBCUTANEOUS at 08:34

## 2024-07-23 RX ADMIN — SODIUM CHLORIDE, POTASSIUM CHLORIDE, SODIUM LACTATE AND CALCIUM CHLORIDE: 600; 310; 30; 20 INJECTION, SOLUTION INTRAVENOUS at 07:03

## 2024-07-23 RX ADMIN — HYDROMORPHONE HYDROCHLORIDE 0.5 MG: 2 INJECTION, SOLUTION INTRAMUSCULAR; INTRAVENOUS; SUBCUTANEOUS at 08:05

## 2024-07-23 RX ADMIN — LIDOCAINE HYDROCHLORIDE 100 MG: 20 INJECTION, SOLUTION INTRAVENOUS at 07:42

## 2024-07-23 RX ADMIN — HYDROMORPHONE HYDROCHLORIDE 0.5 MG: 2 INJECTION, SOLUTION INTRAMUSCULAR; INTRAVENOUS; SUBCUTANEOUS at 08:36

## 2024-07-23 RX ADMIN — ROCURONIUM BROMIDE 50 MG: 10 INJECTION, SOLUTION INTRAVENOUS at 07:44

## 2024-07-23 RX ADMIN — KETOROLAC TROMETHAMINE 30 MG: 30 INJECTION, SOLUTION INTRAMUSCULAR; INTRAVENOUS at 08:21

## 2024-07-23 RX ADMIN — WATER 2000 MG: 1 INJECTION INTRAMUSCULAR; INTRAVENOUS; SUBCUTANEOUS at 07:52

## 2024-07-23 RX ADMIN — LIDOCAINE HYDROCHLORIDE 100 MG: 20 INJECTION, SOLUTION INTRAVENOUS at 08:21

## 2024-07-23 NOTE — BRIEF OP NOTE
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]    Post-Op Diagnosis: nodular synovitis and partial acl tear       Procedure(s):  LEFT KNEE ARTHROSCOPY ,SYNOVIECTOMY AND DEBRIDEMENT    Surgeon(s):  Tyson Woodard MD    Assistant:  Surgical Assistant: Samson Cross    Anesthesia: General    Estimated Blood Loss (mL): Minimal    Complications: None    Specimens:   * No specimens in log *    Implants:  * No implants in log *      Drains: * No LDAs found *    Findings:  Infection Present At Time Of Surgery (PATOS) (choose all levels that have infection present):  No infection present  Other Findings: partial ACL tear    Electronically signed by Tyson Woodard MD on 7/23/2024 at 9:06 AM

## 2024-07-23 NOTE — DISCHARGE INSTRUCTIONS
extra amounts of fluid today.  Lozenges may help.    Muscle Aches  [x]You may experience some generalized body aches as your muscles recover from medications used to relax them during surgery.  These will gradually subside.    MEDICATION INSTRUCTIONS:  [x]Prescription(S) x  1    sent with you.  Use as directed.  When taking pain medications, you may experience the side effect of dizziness or drowsiness.  Do not drink alcohol or drive when taking these medications.  []Prescription(S) x          Called to Pharmacy Name and location:    [x]Give the list of your medications to your primary care physician on your next visit. Keep your med list updated and carry it with in case of emergencies.    [x] Narcotic pain medications can cause the side effect of significant constipation.  You may want to add a stool softener to your postoperative medication schedule or speak to your surgeon on how best to manage this side effect.    NARCOTIC SAFETY:  Your pain medicine is only for you to take.  Safely store your medicines.  Store pills up high and out of reach of children and pets.  Ensure safety caps are snapped tightly  Keep track of how many pills you have left    Unused medication can be disposed of by taking them to a drop-off box or take-back program that is authorized by the [facility].  Access to a site near you can be found on the [facility] [employer] website (deadiversion[employer]).    If you have a CPAP machine, it is very important that you use it daily during all periods of sleep and daytime rest during your recovery at home.  Surgery and Anesthesia place a significant amount of stress on your body.  Using your CPAP will help keep you safe and lessen the negative effects of that stress.    FOLLOW-UP RECOVERY CARE:  [x]Call the office at [phone] for follow-up appointment and problems    Watch for these possible complications, symptoms, or side effects of anesthesia.  Call physician if they or any other

## 2024-07-23 NOTE — H&P
The patient was seen preoperatively. He reports no new medical conditions. His physical exam remains unchanged.    I attest that I met personally with the patient, performed the described exam, reviewed the radiographic studies and medical records associated with this patient and supervised the services that are described above.     Tyson Woodard MD

## 2024-07-23 NOTE — PROGRESS NOTES
PACU Transfer to [facility]    Vitals:    07/23/24 0930   BP: 136/87   Pulse: 61   Resp: 17   Temp: 97.8 °F (36.6 °C)   SpO2: 98%         Intake/Output Summary (Last 24 hours) at 7/23/2024 0935  Last data filed at 7/23/2024 0933  Gross per 24 hour   Intake 2290 ml   Output 20 ml   Net 2270 ml       Pain assessment:  none  Pain Level: 0    Patient transferred to care of [facility] RN.    7/23/2024 9:35 AM

## 2024-07-23 NOTE — ANESTHESIA POSTPROCEDURE EVALUATION
Department of Anesthesiology  Postprocedure Note    Patient: Jeffry Berry  MRN: [id number]  YOB: 2000  Date of evaluation: 7/23/2024    Procedure Summary       Date: 07/23/24 Room / Location: [address]    Anesthesia Start: 0738 Anesthesia Stop: 0845    Procedure: LEFT KNEE ARTHROSCOPY ,SYNOVIECTOMY AND DEBRIDEMENT (Left) Diagnosis:       Cyclops lesion of left knee      (Cyclops lesion of left knee [M25.862])    Surgeons: Tyson Woodard MD Responsible Provider: Glenroy Lagos DO    Anesthesia Type: general ASA Status: 1            Anesthesia Type: No value filed.    Kristin Phase I: Kristin Score: 10    Kristin Phase II: Kristin Score: 10    Vitals:    07/23/24 0937   BP: (!) 141/81   Pulse: 59   Resp: 14   Temp: 97.2 °F (36.2 °C)   SpO2: 98%       Anesthesia Post Evaluation    Patient location during evaluation: PACU  Patient participation: complete - patient participated  Level of consciousness: awake and awake and alert  Pain score: 1  Airway patency: patent  Nausea & Vomiting: no nausea and no vomiting  Cardiovascular status: hemodynamically stable  Respiratory status: acceptable  Hydration status: euvolemic  Pain management: adequate and satisfactory to patient        No notable events documented.

## 2024-07-23 NOTE — OP NOTE
[address] [address]                            OPERATIVE REPORT      PATIENT NAME: TANMAY BLALARD             : 2000  MED REC NO: [id number]                      ROOM: OR  ACCOUNT NO: [id number]                       ADMIT DATE: 2024  PROVIDER: Tyson Woodard MD      DATE OF PROCEDURE:  2024    SURGEON:  Tyson Woodard MD    PROCEDURE:  Left knee arthroscopy, synovectomy with debridement of cyclops lesion.    ANESTHESIA:  General.    PREOPERATIVE DIAGNOSIS:  Cyclops lesion with synovitis following anterior cruciate ligament reconstruction.    POSTOPERATIVE DIAGNOSIS:  Cyclops lesion, synovitis, and partial anterior cruciate ligament tear.    PREPARATION:  ChloraPrep.    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  He did well, returned to training and last week while running, felt a pop in the posterolateral aspect of his knee.  His exam showed his knee was stable both from his MCL and his ACL standpoint.  He did have a reproducible clunk within the joint that was suspicious for cyclops lesion.  MRI confirmed cyclops lesion with intact ACL graft.  He presents for excision.  Risks and benefits of surgery as well as nonsurgical alternatives were discussed in detail with the patient.  He understood and consented to the operation.    DESCRIPTION OF PROCEDURE:  I saw the patient in the holding area and confirmed that the left knee was the operative extremity.  We were able to reproduce a clunk in his knee with leg extension.  We then marked his leg, and he was taken to the OR where after induction of general anesthesia, tourniquet was placed on left thigh.  Left leg was prepped and draped in sterile fashion.  Time-out was performed.  The OR team agreed the left knee was

## 2024-07-23 NOTE — ANESTHESIA PRE PROCEDURE
Department of Anesthesiology  Preprocedure Note       Name:  Jeffry Beryr   Age:  23 y.o.  :  2000                                          MRN:  [id number]         Date:  2024      Surgeon: Surgeon(s):  Tyson Woodard MD    Procedure: Procedure(s):  LEFT KNEE ARTHROSCOPY EXCISION OF CYCLOPS LESION    Medications prior to admission:   Prior to Admission medications    Not on File       Current medications:    Current Facility-Administered Medications   Medication Dose Route Frequency Provider Last Rate Last Admin   • lactated ringers IV soln infusion   IntraVENous Continuous Damien Goodwin  mL/hr at 24 0703 New Bag at 24 0703   • ceFAZolin (ANCEF) 2,000 mg in sterile water 20 mL IV syringe  2,000 mg IntraVENous On Call to OR Tyson Woodard MD           Allergies:  No Known Allergies    Problem List:    Patient Active Problem List   Diagnosis Code   • Cyclops lesion of left knee M25.862       Past Medical History:        Diagnosis Date   • Exercises daily    • Wears contact lenses        Past Surgical History:        Procedure Laterality Date   • FINGER SURGERY Left 2021    OPERATIVE FIXATION OPEN REDUCTION INTERNAL FIXATION LEFT INDEX FINGER PROXIMAL PHALANX FRACTURE performed by J Carlos Ordaz MD at [facility] OR   • KNEE ARTHROSCOPY Left 2023    . performed by Tyson Woodard MD at [facility] OR   • KNEE SURGERY Right     meniscus   • KNEE SURGERY Left 2023    LEFT KNEE ARTHROSCOPY/ ANTERIOR CRUCIATE LIGAMENT RECONSTRUCTION WITH PATELLA BONE GRAFT/ MEDIAL COLLATERAL LIGAMENT REPAIR performed by Tyson Woodard MD at [facility] OR   • SHOULDER SURGERY Right     labrum repair       Social History:    Social History     Tobacco Use   • Smoking status: Never   • Smokeless tobacco: Never   Substance Use Topics   • Alcohol use: Yes     Comment: occaisionally                                Counseling given: Not Answered      Vital Signs (Current):   Vitals:    24 1537

## 2024-07-23 NOTE — PROGRESS NOTES
Patient admitted to PACU # 06 from OR at 0842 post LEFT KNEE ARTHROSCOPY ,SYNOVIECTOMY AND DEBRIDEMENT - Left  per Dr. Woodard.  Attached to PACU monitoring system and report received from anesthesia provider.  Patient was reported to be hemodynamically stable during procedure.  Patient drowsy on admission and denied pain. Pt on 2 L NC. Pt NSR on monitor. RLE surgical drsg c/d/I with ace wrap. Ice pack in place. Will continue to monitor.

## 2024-07-23 NOTE — PROGRESS NOTES
Ambulatory Surgery/Procedure Discharge Note    Vitals:    07/23/24 0937   BP: (!) 141/81   Pulse: 59   Resp: 14   Temp: 97.2 °F (36.2 °C)   SpO2: 98%       In: 2290 [P.O.:240; I.V.:2050]  Out: 20     Restroom use offered before discharge.  Yes    Pain assessment:  level of pain (1-10, 10 severe),   Pain Level: 0    Patient discharged to home with family.     Patient discharged to home/self care. Patient discharged via wheel chair by transporter to waiting family/S.O.       7/23/2024 9:59 AM

## 2024-07-25 ENCOUNTER — HOSPITAL ENCOUNTER (OUTPATIENT)
Dept: PHYSICAL THERAPY | Age: 24
Setting detail: THERAPIES SERIES
Discharge: HOME | End: 2024-07-25
Payer: COMMERCIAL

## 2024-07-25 PROCEDURE — 97110 THERAPEUTIC EXERCISES: CPT

## 2024-07-25 PROCEDURE — 97016 VASOPNEUMATIC DEVICE THERAPY: CPT

## 2024-07-25 PROCEDURE — 97140 MANUAL THERAPY 1/> REGIONS: CPT

## 2024-07-25 NOTE — PLAN OF CARE
[facility] - Outpatient Rehabilitation and Therapy [address] [place] [address] office: [phone] fax: [phone]        Physical Therapy Re-Certification Plan of Care/MD UPDATE      Dear Dr. Woodard  ,    We had the pleasure of treating the following patient for physical therapy services at [facility].  A summary of our findings can be found in the updated assessment below.  This includes our plan of care.  If you have any questions or concerns regarding these findings, please do not hesitate to contact me at the office phone number checked above.  Thank you for the referral.     Physician Signature:________________________________Date:__________________  By signing above (or electronic signature), therapist’s plan is approved by physician      Current Functional Status:   Jeffry Berry 2000 continues to present with functional deficits in ROM, joint mobility, flexibility, endurance of strength, and eccentric control  limiting ability with  running, jumping and cutting  .  During therapy this date, patient required modification of technique, progression of exercises and program, and manual interventions for exercise progression, increasing ROM, reduce/eliminate soft tissue swelling/inflammation/restriction, and kinesthetic sense and proprioception. Patient will continue to benefit from ongoing evaluation and advanced clinical decision from a Physical Therapist to improve pain control, neuromuscular control, endurance, balance and proprioception, and high level IADLs to safely return to PLOF and advanced sport activity without symptoms or restrictions.    Overall Response to Treatment:   [x]Patient is responding well to treatment and improvement is noted with regards to goals   []Patient should continue to improve in reasonable time if they continue HEP   []Patient has plateaued and is no longer responding to skilled PT intervention    []Patient is

## 2024-07-31 ENCOUNTER — HOSPITAL ENCOUNTER (OUTPATIENT)
Dept: PHYSICAL THERAPY | Age: 24
Setting detail: THERAPIES SERIES
Discharge: HOME | End: 2024-07-31
Payer: COMMERCIAL

## 2024-07-31 PROCEDURE — 97140 MANUAL THERAPY 1/> REGIONS: CPT

## 2024-07-31 PROCEDURE — 97110 THERAPEUTIC EXERCISES: CPT

## 2024-07-31 NOTE — FLOWSHEET NOTE
[facility] - Outpatient Rehabilitation and Therapy [address] [place] [address] office: [phone] fax: [phone]         Physical Therapy: TREATMENT/PROGRESS NOTE   Patient: Jeffry Berry (23 y.o. male)   Treatment Date: 2024   :  2000 MRN: [id number]   Visit #: 20   Insurance: Payor: [employer] / Plan: [employer] -  [place] / Product Type: *No Product type* /   Insurance ID: [id number] - ([facility])  Secondary Insurance (if applicable): [employer]   Treatment Diagnosis:    No diagnosis found.    1. Left knee pain, unspecified chronicity  M25.562         2. Effusion of left knee  M25.462         3. Gait difficulty  R26.9       Medical Diagnosis:      Complete tear of anterior cruciate ligament of left knee, initial encounter [S83.512A]   Referring Physician: Tyson Woodard MD  PCP: No primary care provider on file.                             Plan of care signed (Y/N): yes    Date of Patient follow up with Physician: wk of 10/1     Progress Report/POC: NO      POC update due (10 Rx/or 30 days whichever is less OR AUTH LIMITS): POC due 3/6/2024       Visit # Insurance Allowable Auth Needed   [facility] []Yes    []No     Latex Allergy:  [x]NO      []YES  Preferred Language for Healthcare:   [x]English       []other:    SUBJECTIVE EXAMINATION     Patient Report/Comments: Patient states knee is feeling good, hasn't done much being home for break over the weekend. States outside of quad feels tight, knee itself feels fine.     Test used Initial score 2024   Pain Summary VAS 4-5/10 2/10   Functional questionnaire LEFS 90% deficit    ROM        See eval 0-145               Strength  See eval 5/5 quad      5/5 ham                   OBJECTIVE EXAMINATION     Observation:  no real tenderness to palpate the posterior left knee, some slight tightness noted    Test measurements:    Isokinetic Test Results:  (see media for scanned results)  Bilateral Difference:  Quadricep

## 2024-08-15 ENCOUNTER — HOSPITAL ENCOUNTER (OUTPATIENT)
Dept: PHYSICAL THERAPY | Age: 24
Setting detail: THERAPIES SERIES
Discharge: HOME | End: 2024-08-15
Payer: COMMERCIAL

## 2024-08-15 PROCEDURE — 97110 THERAPEUTIC EXERCISES: CPT

## 2024-08-15 PROCEDURE — 97140 MANUAL THERAPY 1/> REGIONS: CPT

## 2024-08-15 NOTE — FLOWSHEET NOTE
[facility] - Outpatient Rehabilitation and Therapy [address] [place] [address] office: [phone] fax: [phone]         Physical Therapy: TREATMENT/PROGRESS NOTE   Patient: Jeffry Berry (23 y.o. male)   Treatment Date: 08/15/2024   :  2000 MRN: [id number]   Visit #: 20   Insurance: Payor: [employer] / [place] [employer] -  [place] / Product Type: *No Product type* /   Insurance ID: [id number] - ([facility])  Secondary Insurance (if applicable): [employer]   Treatment Diagnosis:    No diagnosis found.    1. Left knee pain, unspecified chronicity  M25.562         2. Effusion of left knee  M25.462         3. Gait difficulty  R26.9       Medical Diagnosis:      Complete tear of anterior cruciate ligament of left knee, initial encounter [S83.512A]   Referring Physician: Tyson Woodard MD  PCP: No primary care provider on file.                             Plan of care signed (Y/N): yes    Date of Patient follow up with Physician: wk of 10/1     Progress Report/POC: NO      POC update due (10 Rx/or 30 days whichever is less OR AUTH LIMITS): POC due 3/6/2024       Visit # Insurance Allowable Auth Needed   [facility] []Yes    []No     Latex Allergy:  [x]NO      []YES  Preferred Language for Healthcare:   [x]English       []other:    SUBJECTIVE EXAMINATION     Patient Report/Comments: Patient reports that he is feeling pretty good overall through left knee however, he strained his right quad this week at practice and has been out favoring it a bit.      Test used Initial score 2024   Pain Summary VAS 4-5/10 2/10   Functional questionnaire LEFS 90% deficit    ROM        See eval 0-145               Strength  See eval 5/5 quad      5/5 ham                   OBJECTIVE EXAMINATION     Observation:  no real tenderness to palpate the posterior left knee, some slight tightness noted    Test measurements:    Isokinetic Test Results:  (see media for scanned results)  Bilateral

## 2024-08-19 ENCOUNTER — HOSPITAL ENCOUNTER (OUTPATIENT)
Dept: PHYSICAL THERAPY | Age: 24
Setting detail: THERAPIES SERIES
Discharge: HOME | End: 2024-08-19
Payer: COMMERCIAL

## 2024-08-19 PROCEDURE — 97140 MANUAL THERAPY 1/> REGIONS: CPT

## 2024-08-19 PROCEDURE — 97110 THERAPEUTIC EXERCISES: CPT

## 2024-08-19 NOTE — FLOWSHEET NOTE
[facility] - Outpatient Rehabilitation and Therapy [address] [place] [address] office: [phone] fax: [phone]         Physical Therapy: TREATMENT/PROGRESS NOTE   Patient: Jeffry Berry (23 y.o. male)   Treatment Date: 2024   :  2000 MRN: [id number]   Visit #: 20   Insurance: Payor: [employer] / Plan: [employer] -  [place] / Product Type: *No Product type* /   Insurance ID: [id number] - ([facility])  Secondary Insurance (if applicable): [employer]   Treatment Diagnosis:    No diagnosis found.    1. Left knee pain, unspecified chronicity  M25.562         2. Effusion of left knee  M25.462         3. Gait difficulty  R26.9       Medical Diagnosis:      Complete tear of anterior cruciate ligament of left knee, initial encounter [S83.512A]   Referring Physician: Tyson Woodard MD  PCP: No primary care provider on file.                             Plan of care signed (Y/N): yes    Date of Patient follow up with Physician: wk of 10/1     Progress Report/POC: NO      POC update due (10 Rx/or 30 days whichever is less OR AUTH LIMITS): POC due 3/6/2024       Visit # Insurance Allowable Auth Needed   [facility] []Yes    []No     Latex Allergy:  [x]NO      []YES  Preferred Language for Healthcare:   [x]English       []other:    SUBJECTIVE EXAMINATION     Patient Report/Comments: Patient states left knee is feeling great and he is progressing back into full football activity well. Right quad is getting better.      Test used Initial score 2024   Pain Summary VAS 4-5/10 2/10   Functional questionnaire LEFS 90% deficit    ROM        See eval 0-145               Strength  See eval 5/5 quad      5/5 ham                   OBJECTIVE EXAMINATION     Observation:  no real tenderness to palpate the posterior left knee, some slight tightness noted    Test measurements:    Isokinetic Test Results:  (see media for scanned results)  Bilateral Difference:  Quadricep 60 deg/sec: 20%

## 2024-08-23 ENCOUNTER — HOSPITAL ENCOUNTER (OUTPATIENT)
Dept: PHYSICAL THERAPY | Age: 24
Setting detail: THERAPIES SERIES
Discharge: HOME | End: 2024-08-23
Payer: COMMERCIAL

## 2024-08-23 PROCEDURE — 97530 THERAPEUTIC ACTIVITIES: CPT

## 2024-08-23 PROCEDURE — 97110 THERAPEUTIC EXERCISES: CPT

## 2024-08-23 NOTE — FLOWSHEET NOTE
[facility] - Outpatient Rehabilitation and Therapy [address] [place] [address] office: [phone] fax: [phone]         Physical Therapy: TREATMENT/PROGRESS NOTE   Patient: Jeffry Berry (23 y.o. male)   Treatment Date: 2024   :  2000 MRN: [id number]   Visit #: 20   Insurance: Payor: [employer] / Plan: [employer] -  [place] / Product Type: *No Product type* /   Insurance ID: [id number] - ([facility])  Secondary Insurance (if applicable): A [employer]   Treatment Diagnosis:    No diagnosis found.    1. Left knee pain, unspecified chronicity  M25.562         2. Effusion of left knee  M25.462         3. Gait difficulty  R26.9       Medical Diagnosis:      Complete tear of anterior cruciate ligament of left knee, initial encounter [S83.512A]   Referring Physician: Tyson Woodard MD  PCP: No primary care provider on file.                             Plan of care signed (Y/N): yes    Date of Patient follow up with Physician: wk of 10/1     Progress Report/POC: NO      POC update due (10 Rx/or 30 days whichever is less OR AUTH LIMITS): POC due 3/6/2024       Visit # Insurance Allowable Auth Needed   [facility] []Yes    []No     Latex Allergy:  [x]NO      []YES  Preferred Language for Healthcare:   [x]English       []other:    SUBJECTIVE EXAMINATION     Patient Report/Comments: Patient states left knee is feeling great and he is progressing back into full football activity well. Right quad is getting better.      Test used Initial score 2024   Pain Summary VAS 4-5/10 2/10   Functional questionnaire LEFS 90% deficit    ROM        See eval 0-145               Strength  See eval 5/5 quad      5/5 ham                   OBJECTIVE EXAMINATION     Observation:  no real tenderness to palpate the posterior left knee, some slight tightness noted    Test measurements:    Isokinetic Test Results:  (see media for scanned results)  Bilateral Difference:  Quadricep 60 deg/sec: 20%

## 2024-08-27 ENCOUNTER — HOSPITAL ENCOUNTER (OUTPATIENT)
Dept: PHYSICAL THERAPY | Age: 24
Setting detail: THERAPIES SERIES
Discharge: HOME | End: 2024-08-27
Payer: COMMERCIAL

## 2024-08-27 PROCEDURE — 97110 THERAPEUTIC EXERCISES: CPT

## 2024-08-27 PROCEDURE — 97140 MANUAL THERAPY 1/> REGIONS: CPT

## 2024-08-27 NOTE — FLOWSHEET NOTE
[facility] - Outpatient Rehabilitation and Therapy [address] [place] [id number] office: [phone] fax: [phone]         Physical Therapy: TREATMENT/PROGRESS NOTE   Patient: Jeffry Berry (23 y.o. male)   Treatment Date: 2024   :  2000 MRN: [id number]   Visit #: 20   Insurance: Payor: [employer] / [place]: [employer] -  [place] / Product Type: *No Product type* /   Insurance ID: [id number] - ([facility])  Secondary Insurance (if applicable): [employer]   Treatment Diagnosis:    No diagnosis found.    1. Left knee pain, unspecified chronicity  M25.562         2. Effusion of left knee  M25.462         3. Gait difficulty  R26.9       Medical Diagnosis:      Complete tear of anterior cruciate ligament of left knee, initial encounter [S83.512A]   Referring Physician: Tyson Woodard MD  PCP: No primary care provider on file.                             Plan of care signed (Y/N): yes    Date of Patient follow up with Physician: wk of 10/1     Progress Report/POC: NO      POC update due (10 Rx/or 30 days whichever is less OR AUTH LIMITS): POC due 3/6/2024       Visit # Insurance Allowable Auth Needed   [facility] []Yes    []No     Latex Allergy:  [x]NO      []YES  Preferred Language for Healthcare:   [x]English       []other:    SUBJECTIVE EXAMINATION     Patient Report/Comments: Patient reports right quad strain seems to have completely resolved and his left knee continues to make strength gains.      Test used Initial score 2024   Pain Summary VAS 4-5/10 2/10   Functional questionnaire LEFS 90% deficit    ROM        See eval 0-145               Strength  See eval 5/5 quad      5/5 ham                   OBJECTIVE EXAMINATION     Observation:  no real tenderness to palpate the posterior left knee, some slight tightness noted    Test measurements:    Isokinetic Test Results:  (see media for scanned results)  Bilateral Difference:  Quadricep 60 deg/sec: 20% [x] Deficit

## 2024-08-29 ENCOUNTER — HOSPITAL ENCOUNTER (OUTPATIENT)
Dept: PHYSICAL THERAPY | Age: 24
Setting detail: THERAPIES SERIES
Discharge: HOME | End: 2024-08-29
Payer: COMMERCIAL

## 2024-08-29 PROCEDURE — 97530 THERAPEUTIC ACTIVITIES: CPT

## 2024-08-29 PROCEDURE — 97110 THERAPEUTIC EXERCISES: CPT

## 2024-08-29 NOTE — FLOWSHEET NOTE
results to greater than 65%BW of quads at 60deg/sec speed. MET 3/22/2024  Patient will demonstrate SL squat test to 1 minute with results even to uninvolved knee. MET 5/8/2024  Patient will demonstrate sprint and jump mechanics without deviation or compensation. PROGRESSING  5/8/2024      Overall Progression Towards Functional goals/ Treatment Progress Update:  [x] Patient is progressing as expected towards functional goals listed.    [] Progression is slowed due to complexities/Impairments listed.  [] Progression has been slowed due to co-morbidities.  [] Plan just implemented, too soon (<30days) to assess goals progression   [] Goals require adjustment due to lack of progress  [] Patient is not progressing as expected and requires additional follow up with physician  [] Other:     CHARGE CAPTURE     CPT Code (TIMED) minutes # CPT Code (UNTIMED) #     [x] Therex (68016)  20 1  [] EVAL:LOW (26580 - Typically 20 minutes face-to-face)     [] Neuromusc. Re-ed (94170)    [] Re-Eval (30129)     [] Manual (88182)    [] Estim Unattended (55868)     [x] Ther. Act (00452) 15 1  [] Mech. Traction (53696)     [] Gait (56319)    [] Dry Needle 1-2 muscle (20560)     [] Aquatic Therex (28715)    [] Dry Needle 3+ muscle (20561)     [] Iontophoresis (27833)    [] VASO (37261)     [] Ultrasound (71871)    [] Group Therapy (40513)     [] Estim Attended (65243)    [] Other:phys perf test    Total Timed Code Tx Minutes 35 2       Total Treatment Minutes 35        Charge Justification:  (42467) THERAPEUTIC EXERCISE - Provided verbal/tactile cueing for activities related to strengthening, flexibility, endurance, ROM performed to prevent loss of range of motion, maintain or improve muscular strength or increase flexibility, following either an injury or surgery.   (20673) HOME EXERCISE PROGRAM- Reviewed/Progressed HEP activities related to strengthening, flexibility, endurance, ROM performed to prevent loss of range of motion, maintain or  improve muscular strength or increase flexibility, following either an injury or surgery.  (57126) NEUROMUSCULAR RE-EDUCATION- Therapeutic procedure, 1 or more areas, each 15 minutes; neuromuscular reeducation of movement, balance, coordination, kinesthetic sense, posture, and/or proprioception for sitting and/or standing activities  (67847) MANUAL THERAPY-  Manual therapy techniques, 1 or more regions, each 15 minutes (Mobilization/manipulation, manual lymphatic drainage, manual traction) for the purpose of modulating pain, promoting relaxation,  increasing ROM, reducing/eliminating soft tissue swelling/inflammation/restriction, improving soft tissue extensibility and allowing for proper ROM for normal function with self care, mobility, lifting and ambulation  (48233) VASOPNEUMATIC    TREATMENT PLAN   Plan:  Will continue formal PT to work on decreased swelling, regain strength and get into plyometric work and manage first couple weeks of season.     Electronically Signed by Soraya Olmstead, PT  Date: 08/29/2024     Note: If patient does not return for scheduled/recommended follow up visits, this note will serve as a discharge from care along with the most recent update on progress.

## 2024-08-29 NOTE — FLOWSHEET NOTE
[facility] - Outpatient Rehabilitation and Therapy [address] [place] [address] office: [phone] fax: [phone]         Physical Therapy: TREATMENT/PROGRESS NOTE   Patient: Jeffry Berry (23 y.o. male)   Treatment Date: 2024   :  2000 MRN: [id number]   Visit #: 20   Insurance: Payor: [employer] / Plan: [employer] -  [place] / Product Type: *No Product type* /   Insurance ID: [id number] - ([facility])  Secondary Insurance (if applicable): [employer]   Treatment Diagnosis:    No diagnosis found.    1. Left knee pain, unspecified chronicity  M25.562         2. Effusion of left knee  M25.462         3. Gait difficulty  R26.9       Medical Diagnosis:      Complete tear of anterior cruciate ligament of left knee, initial encounter [S83.726A]   Referring Physician: Tyson Woodard MD  PCP: No primary care provider on file.                             Plan of care signed (Y/N): yes    Date of Patient follow up with Physician: wk of 10/1     Progress Report/POC: NO      POC update due (10 Rx/or 30 days whichever is less OR AUTH LIMITS): POC due 3/6/2024       Visit # Insurance Allowable Auth Needed   [facility] []Yes    []No     Latex Allergy:  [x]NO      []YES  Preferred Language for Healthcare:   [x]English       []other:    SUBJECTIVE EXAMINATION     Patient Report/Comments: Patient states some soreness through right quad, feels little tight. Had an early practice this morning.       Test used Initial score 2024   Pain Summary VAS 4-5/10 2/10   Functional questionnaire LEFS 90% deficit    ROM        See eval 0-145               Strength  See eval 5/5 quad      5/5 ham                   OBJECTIVE EXAMINATION     Observation:  no real tenderness to palpate the posterior left knee, some slight tightness noted    Test measurements:    Isokinetic Test Results:  (see media for scanned results)  Bilateral Difference:  Quadricep 60 deg/sec: 20% [x] Deficit   [] Surplus 180

## 2024-09-06 ENCOUNTER — HOSPITAL ENCOUNTER (OUTPATIENT)
Dept: PHYSICAL THERAPY | Age: 24
Setting detail: THERAPIES SERIES
Discharge: HOME | End: 2024-09-06
Payer: COMMERCIAL

## 2024-09-06 PROCEDURE — 97110 THERAPEUTIC EXERCISES: CPT

## 2024-09-06 PROCEDURE — 97140 MANUAL THERAPY 1/> REGIONS: CPT

## 2024-09-06 NOTE — FLOWSHEET NOTE
muscular strength or increase flexibility, following either an injury or surgery.   (02700) HOME EXERCISE PROGRAM- Reviewed/Progressed HEP activities related to strengthening, flexibility, endurance, ROM performed to prevent loss of range of motion, maintain or improve muscular strength or increase flexibility, following either an injury or surgery.  (65404) NEUROMUSCULAR RE-EDUCATION- Therapeutic procedure, 1 or more areas, each 15 minutes; neuromuscular reeducation of movement, balance, coordination, kinesthetic sense, posture, and/or proprioception for sitting and/or standing activities  (69907) MANUAL THERAPY-  Manual therapy techniques, 1 or more regions, each 15 minutes (Mobilization/manipulation, manual lymphatic drainage, manual traction) for the purpose of modulating pain, promoting relaxation,  increasing ROM, reducing/eliminating soft tissue swelling/inflammation/restriction, improving soft tissue extensibility and allowing for proper ROM for normal function with self care, mobility, lifting and ambulation  (87099) VASOPNEUMATIC    TREATMENT PLAN   Plan:  Will continue formal PT to work on decreased swelling, regain strength and get into plyometric work and manage first couple weeks of season.     Electronically Signed by Soraya Olmstead, PT  Date: 09/06/2024     Note: If patient does not return for scheduled/recommended follow up visits, this note will serve as a discharge from care along with the most recent update on progress.

## 2024-09-13 ENCOUNTER — HOSPITAL ENCOUNTER (OUTPATIENT)
Dept: PHYSICAL THERAPY | Age: 24
Setting detail: THERAPIES SERIES
Discharge: HOME | End: 2024-09-13
Payer: COMMERCIAL

## 2024-09-13 PROCEDURE — 97110 THERAPEUTIC EXERCISES: CPT

## 2024-09-13 PROCEDURE — 97140 MANUAL THERAPY 1/> REGIONS: CPT

## 2024-09-13 PROCEDURE — 97530 THERAPEUTIC ACTIVITIES: CPT

## 2024-09-16 ENCOUNTER — HOSPITAL ENCOUNTER (OUTPATIENT)
Dept: PHYSICAL THERAPY | Age: 24
Setting detail: THERAPIES SERIES
Discharge: HOME | End: 2024-09-16
Payer: COMMERCIAL

## 2024-09-16 PROCEDURE — 97140 MANUAL THERAPY 1/> REGIONS: CPT

## 2024-09-16 PROCEDURE — 97110 THERAPEUTIC EXERCISES: CPT

## 2024-09-18 ENCOUNTER — APPOINTMENT (OUTPATIENT)
Dept: PHYSICAL THERAPY | Age: 24
End: 2024-09-18
Payer: COMMERCIAL

## 2024-09-19 ENCOUNTER — HOSPITAL ENCOUNTER (OUTPATIENT)
Dept: PHYSICAL THERAPY | Age: 24
Setting detail: THERAPIES SERIES
Discharge: HOME | End: 2024-09-19
Payer: COMMERCIAL

## 2024-09-19 PROCEDURE — 97140 MANUAL THERAPY 1/> REGIONS: CPT

## 2024-09-19 PROCEDURE — 97110 THERAPEUTIC EXERCISES: CPT

## 2024-09-23 ENCOUNTER — HOSPITAL ENCOUNTER (OUTPATIENT)
Dept: PHYSICAL THERAPY | Age: 24
Setting detail: THERAPIES SERIES
Discharge: HOME | End: 2024-09-23
Payer: COMMERCIAL

## 2024-09-23 PROCEDURE — 97140 MANUAL THERAPY 1/> REGIONS: CPT

## 2024-09-27 ENCOUNTER — HOSPITAL ENCOUNTER (OUTPATIENT)
Dept: PHYSICAL THERAPY | Age: 24
Setting detail: THERAPIES SERIES
Discharge: HOME | End: 2024-09-27
Payer: COMMERCIAL

## 2024-09-27 PROCEDURE — 97140 MANUAL THERAPY 1/> REGIONS: CPT

## 2024-09-27 PROCEDURE — 97110 THERAPEUTIC EXERCISES: CPT

## 2024-09-30 ENCOUNTER — HOSPITAL ENCOUNTER (OUTPATIENT)
Dept: PHYSICAL THERAPY | Age: 24
Setting detail: THERAPIES SERIES
Discharge: HOME | End: 2024-09-30
Payer: COMMERCIAL

## 2024-09-30 PROCEDURE — 97140 MANUAL THERAPY 1/> REGIONS: CPT

## 2024-09-30 PROCEDURE — 97110 THERAPEUTIC EXERCISES: CPT

## 2024-09-30 NOTE — FLOWSHEET NOTE
strength or increase flexibility, following either an injury or surgery.   (26011) HOME EXERCISE PROGRAM- Reviewed/Progressed HEP activities related to strengthening, flexibility, endurance, ROM performed to prevent loss of range of motion, maintain or improve muscular strength or increase flexibility, following either an injury or surgery.  (70919) NEUROMUSCULAR RE-EDUCATION- Therapeutic procedure, 1 or more areas, each 15 minutes; neuromuscular reeducation of movement, balance, coordination, kinesthetic sense, posture, and/or proprioception for sitting and/or standing activities  (04110) MANUAL THERAPY-  Manual therapy techniques, 1 or more regions, each 15 minutes (Mobilization/manipulation, manual lymphatic drainage, manual traction) for the purpose of modulating pain, promoting relaxation,  increasing ROM, reducing/eliminating soft tissue swelling/inflammation/restriction, improving soft tissue extensibility and allowing for proper ROM for normal function with self care, mobility, lifting and ambulation  (31704) VASOPNEUMATIC    TREATMENT PLAN   Plan:  continue to push higher level functional strength and manage bilateral LE tightness and strength for high level sport participation.      Electronically Signed by Soraya Olmstead, PT  Date: 09/30/2024     Note: If patient does not return for scheduled/recommended follow up visits, this note will serve as a discharge from care along with the most recent update on progress.

## 2024-10-03 ENCOUNTER — HOSPITAL ENCOUNTER (OUTPATIENT)
Dept: PHYSICAL THERAPY | Age: 24
Setting detail: THERAPIES SERIES
Discharge: HOME | End: 2024-10-03
Payer: COMMERCIAL

## 2024-10-03 PROCEDURE — 97140 MANUAL THERAPY 1/> REGIONS: CPT

## 2024-10-03 PROCEDURE — 97110 THERAPEUTIC EXERCISES: CPT

## 2024-10-03 NOTE — FLOWSHEET NOTE
[facility] - Outpatient Rehabilitation and Therapy [address] [place] [address] office: [phone] fax: [phone]             Physical Therapy: TREATMENT/PROGRESS NOTE   Patient: Jeffry Berry (24 y.o. male)   Treatment Date: 10/03/2024   :  2000 MRN: [id number]   Visit #: 20   Insurance: Payor: [employer] / Plan: [employer] -  [place] / Product Type: *No Product type* /   Insurance ID: [id number] - ([facility])  Secondary Insurance (if applicable): [employer]   Treatment Diagnosis:    No diagnosis found.    1. Left knee pain, unspecified chronicity  M25.562         2. Effusion of left knee  M25.462         3. Gait difficulty  R26.9       Medical Diagnosis:      Complete tear of anterior cruciate ligament of left knee, initial encounter [S83.512A]   Referring Physician: Tyson Woodard MD  PCP: No primary care provider on file.                             Plan of care signed (Y/N): yes    Date of Patient follow up with Physician: wk of 10/1     Progress Report/POC: NO      POC update due (10 Rx/or 30 days whichever is less OR AUTH LIMITS): POC due 3/6/2024       Visit # Insurance Allowable Auth Needed   [facility] []Yes    []No     Latex Allergy:  [x]NO      []YES  Preferred Language for Healthcare:   [x]English       []other:    SUBJECTIVE EXAMINATION     Patient Report/Comments: Patient reports that he was able to get back into some practice yesterday and felt pretty good. He is feeling stronger and more confident through left LE.            Test used Initial score 2024   Pain Summary VAS 4-5/10 2/10   Functional questionnaire LEFS 90% deficit    ROM        See eval 0-145               Strength  See eval 5/5 quad      5/5 ham                   OBJECTIVE EXAMINATION     Observation:  no real tenderness to palpate the posterior left knee, some slight tightness noted    Test measurements:    Isokinetic Test Results:  (see media for scanned results)  Bilateral

## 2024-10-07 ENCOUNTER — APPOINTMENT (OUTPATIENT)
Dept: PHYSICAL THERAPY | Age: 24
End: 2024-10-07
Payer: COMMERCIAL

## 2024-10-10 ENCOUNTER — HOSPITAL ENCOUNTER (OUTPATIENT)
Dept: PHYSICAL THERAPY | Age: 24
Setting detail: THERAPIES SERIES
Discharge: HOME | End: 2024-10-10
Payer: COMMERCIAL

## 2024-10-10 PROCEDURE — 97140 MANUAL THERAPY 1/> REGIONS: CPT

## 2024-10-10 PROCEDURE — 97110 THERAPEUTIC EXERCISES: CPT

## 2024-10-10 NOTE — FLOWSHEET NOTE
endurance, ROM performed to prevent loss of range of motion, maintain or improve muscular strength or increase flexibility, following either an injury or surgery.   (09820) HOME EXERCISE PROGRAM- Reviewed/Progressed HEP activities related to strengthening, flexibility, endurance, ROM performed to prevent loss of range of motion, maintain or improve muscular strength or increase flexibility, following either an injury or surgery.  (35155) NEUROMUSCULAR RE-EDUCATION- Therapeutic procedure, 1 or more areas, each 15 minutes; neuromuscular reeducation of movement, balance, coordination, kinesthetic sense, posture, and/or proprioception for sitting and/or standing activities  (37722) MANUAL THERAPY-  Manual therapy techniques, 1 or more regions, each 15 minutes (Mobilization/manipulation, manual lymphatic drainage, manual traction) for the purpose of modulating pain, promoting relaxation,  increasing ROM, reducing/eliminating soft tissue swelling/inflammation/restriction, improving soft tissue extensibility and allowing for proper ROM for normal function with self care, mobility, lifting and ambulation  (38486) VASOPNEUMATIC    TREATMENT PLAN   Plan:  continue to push higher level functional strength and manage bilateral LE tightness and strength for high level sport participation.      Electronically Signed by Soraya Olmstead PT  Date: 10/10/2024     Note: If patient does not return for scheduled/recommended follow up visits, this note will serve as a discharge from care along with the most recent update on progress.

## 2024-10-14 ENCOUNTER — HOSPITAL ENCOUNTER (OUTPATIENT)
Dept: PHYSICAL THERAPY | Age: 24
Setting detail: THERAPIES SERIES
Discharge: HOME | End: 2024-10-14
Payer: COMMERCIAL

## 2024-10-14 PROCEDURE — 97140 MANUAL THERAPY 1/> REGIONS: CPT

## 2024-10-14 PROCEDURE — 97110 THERAPEUTIC EXERCISES: CPT

## 2024-10-14 NOTE — FLOWSHEET NOTE
[facility] - Outpatient Rehabilitation and Therapy [address] [place] [address] office: [phone] fax: [phone]             Physical Therapy: TREATMENT/PROGRESS NOTE   Patient: Jeffry Berry (24 y.o. male)   Treatment Date: 10/14/2024   :  2000 MRN: [id number]   Visit #: 20   Insurance: Payor: [employer] / Plan: [employer] -  [place] / Product Type: *No Product type* /   Insurance ID: [id number] - ([facility])  Secondary Insurance (if applicable): [employer]   Treatment Diagnosis:    No diagnosis found.    1. Left knee pain, unspecified chronicity  M25.562         2. Effusion of left knee  M25.462         3. Gait difficulty  R26.9       Medical Diagnosis:      Complete tear of anterior cruciate ligament of left knee, initial encounter [S83.512A]   Referring Physician: Tyson Woodard MD  PCP: No primary care provider on file.                             Plan of care signed (Y/N): yes    Date of Patient follow up with Physician: wk of 10/1     Progress Report/POC: NO      POC update due (10 Rx/or 30 days whichever is less OR AUTH LIMITS): POC due 3/6/2024       Visit # Insurance Allowable Auth Needed   [facility] []Yes    []No     Latex Allergy:  [x]NO      []YES  Preferred Language for Healthcare:   [x]English       []other:    SUBJECTIVE EXAMINATION     Patient Report/Comments: Patient reports right ankle is pretty sore today which he attributes to running in underwater treadmill yesterday. He states that he is going to try and run on land tomorrow, so he wants to hold on [employer] today.              Test used Initial score 2024   Pain Summary VAS 4-5/10 2/10   Functional questionnaire LEFS 90% deficit    ROM        See eval 0-145               Strength  See eval 5/5 quad      5/5 ham                   OBJECTIVE EXAMINATION     Observation: tender through right achilles and distal soleus, stiff with all motions today    Test measurements:    Isokinetic Test Results:

## 2024-10-17 ENCOUNTER — APPOINTMENT (OUTPATIENT)
Dept: PHYSICAL THERAPY | Age: 24
End: 2024-10-17
Payer: COMMERCIAL

## 2024-10-18 ENCOUNTER — APPOINTMENT (OUTPATIENT)
Dept: PHYSICAL THERAPY | Age: 24
End: 2024-10-18
Payer: COMMERCIAL

## 2024-10-21 ENCOUNTER — HOSPITAL ENCOUNTER (OUTPATIENT)
Dept: PHYSICAL THERAPY | Age: 24
Setting detail: THERAPIES SERIES
Discharge: HOME | End: 2024-10-21
Payer: COMMERCIAL

## 2024-10-21 PROCEDURE — 97140 MANUAL THERAPY 1/> REGIONS: CPT

## 2024-10-21 PROCEDURE — 97110 THERAPEUTIC EXERCISES: CPT

## 2024-10-22 NOTE — FLOWSHEET NOTE
HEP activities related to strengthening, flexibility, endurance, ROM performed to prevent loss of range of motion, maintain or improve muscular strength or increase flexibility, following either an injury or surgery.  (40543) NEUROMUSCULAR RE-EDUCATION- Therapeutic procedure, 1 or more areas, each 15 minutes; neuromuscular reeducation of movement, balance, coordination, kinesthetic sense, posture, and/or proprioception for sitting and/or standing activities  (10684) MANUAL THERAPY-  Manual therapy techniques, 1 or more regions, each 15 minutes (Mobilization/manipulation, manual lymphatic drainage, manual traction) for the purpose of modulating pain, promoting relaxation,  increasing ROM, reducing/eliminating soft tissue swelling/inflammation/restriction, improving soft tissue extensibility and allowing for proper ROM for normal function with self care, mobility, lifting and ambulation  (02292) VASOPNEUMATIC    TREATMENT PLAN   Plan:  continue to push higher level functional strength and manage bilateral LE tightness and strength for high level sport participation.      Electronically Signed by Soraya Olmstead PT  Date: 10/21/2024     Note: If patient does not return for scheduled/recommended follow up visits, this note will serve as a discharge from care along with the most recent update on progress.

## 2024-10-24 ENCOUNTER — APPOINTMENT (OUTPATIENT)
Dept: PHYSICAL THERAPY | Age: 24
End: 2024-10-24
Payer: COMMERCIAL

## 2024-10-25 ENCOUNTER — HOSPITAL ENCOUNTER (OUTPATIENT)
Dept: PHYSICAL THERAPY | Age: 24
Setting detail: THERAPIES SERIES
Discharge: HOME | End: 2024-10-25
Payer: COMMERCIAL

## 2024-10-25 PROCEDURE — 97110 THERAPEUTIC EXERCISES: CPT

## 2024-10-25 PROCEDURE — 97140 MANUAL THERAPY 1/> REGIONS: CPT

## 2024-10-25 NOTE — FLOWSHEET NOTE
either an injury or surgery.   (79044) HOME EXERCISE PROGRAM- Reviewed/Progressed HEP activities related to strengthening, flexibility, endurance, ROM performed to prevent loss of range of motion, maintain or improve muscular strength or increase flexibility, following either an injury or surgery.  (77977) NEUROMUSCULAR RE-EDUCATION- Therapeutic procedure, 1 or more areas, each 15 minutes; neuromuscular reeducation of movement, balance, coordination, kinesthetic sense, posture, and/or proprioception for sitting and/or standing activities  (11896) MANUAL THERAPY-  Manual therapy techniques, 1 or more regions, each 15 minutes (Mobilization/manipulation, manual lymphatic drainage, manual traction) for the purpose of modulating pain, promoting relaxation,  increasing ROM, reducing/eliminating soft tissue swelling/inflammation/restriction, improving soft tissue extensibility and allowing for proper ROM for normal function with self care, mobility, lifting and ambulation  (84368) VASOPNEUMATIC    TREATMENT PLAN   Plan:  continue to push higher level functional strength and manage bilateral LE tightness and strength for high level sport participation.      Electronically Signed by Soraya Olmstead PT  Date: 10/25/2024     Note: If patient does not return for scheduled/recommended follow up visits, this note will serve as a discharge from care along with the most recent update on progress.

## 2024-10-29 ENCOUNTER — HOSPITAL ENCOUNTER (OUTPATIENT)
Dept: PHYSICAL THERAPY | Age: 24
Setting detail: THERAPIES SERIES
Discharge: HOME | End: 2024-10-29
Payer: COMMERCIAL

## 2024-10-29 PROCEDURE — 97140 MANUAL THERAPY 1/> REGIONS: CPT

## 2024-10-29 PROCEDURE — 97110 THERAPEUTIC EXERCISES: CPT

## 2024-10-29 NOTE — FLOWSHEET NOTE
either an injury or surgery.   (61810) HOME EXERCISE PROGRAM- Reviewed/Progressed HEP activities related to strengthening, flexibility, endurance, ROM performed to prevent loss of range of motion, maintain or improve muscular strength or increase flexibility, following either an injury or surgery.  (30277) NEUROMUSCULAR RE-EDUCATION- Therapeutic procedure, 1 or more areas, each 15 minutes; neuromuscular reeducation of movement, balance, coordination, kinesthetic sense, posture, and/or proprioception for sitting and/or standing activities  (29508) MANUAL THERAPY-  Manual therapy techniques, 1 or more regions, each 15 minutes (Mobilization/manipulation, manual lymphatic drainage, manual traction) for the purpose of modulating pain, promoting relaxation,  increasing ROM, reducing/eliminating soft tissue swelling/inflammation/restriction, improving soft tissue extensibility and allowing for proper ROM for normal function with self care, mobility, lifting and ambulation  (77807) VASOPNEUMATIC    TREATMENT PLAN   Plan:  continue to push higher level functional strength and manage bilateral LE tightness and strength for high level sport participation.      Therapist was present, directed the patient's care, made skilled judgement, and was responsible for assessment and treatment of the patient.   Lisa Harris, BO    Electronically Signed by Soraya Olmstead, PT  Date: 10/29/2024     Note: If patient does not return for scheduled/recommended follow up visits, this note will serve as a discharge from care along with the most recent update on progress.

## 2024-10-31 ENCOUNTER — HOSPITAL ENCOUNTER (OUTPATIENT)
Dept: PHYSICAL THERAPY | Age: 24
Setting detail: THERAPIES SERIES
Discharge: HOME | End: 2024-10-31
Payer: COMMERCIAL

## 2024-10-31 PROCEDURE — 97140 MANUAL THERAPY 1/> REGIONS: CPT

## 2024-10-31 PROCEDURE — 97110 THERAPEUTIC EXERCISES: CPT

## 2024-10-31 NOTE — FLOWSHEET NOTE
Status: [] Progressing: [x] Met: [] Not Met: [] Adjusted    NEW GOALS SET 2/6/2024  Patient will demonstrate improved isokinetic test results to greater than 65%BW of quads at 60deg/sec speed. MET 3/22/2024  Patient will demonstrate SL squat test to 1 minute with results even to uninvolved knee. MET 5/8/2024  Patient will demonstrate sprint and jump mechanics without deviation or compensation. PROGRESSING  5/8/2024      Overall Progression Towards Functional goals/ Treatment Progress Update:  [x] Patient is progressing as expected towards functional goals listed.    [] Progression is slowed due to complexities/Impairments listed.  [] Progression has been slowed due to co-morbidities.  [] Plan just implemented, too soon (<30days) to assess goals progression   [] Goals require adjustment due to lack of progress  [] Patient is not progressing as expected and requires additional follow up with physician  [] Other:     CHARGE CAPTURE     CPT Code (TIMED) minutes # CPT Code (UNTIMED) #     [x] Therex (72433)  20 1  [] EVAL:LOW (06833 - Typically 20 minutes face-to-face)     [] Neuromusc. Re-ed (22179)    [] Re-Eval (47206)     [x] Manual (15289) 20 1  [] Estim Unattended (01900)     [] Ther. Act (89907)    [] Mech. Traction (39085)     [] Gait (37572)    [] Dry Needle 1-2 muscle (20560)     [] Aquatic Therex (95721)    [] Dry Needle 3+ muscle (20561)     [] Iontophoresis (91167)    [] VASO (56865)     [] Ultrasound (37374)    [] Group Therapy (34793)     [] Estim Attended (83077)    [] Other:phys perf test    Total Timed Code Tx Minutes 40 2       Total Treatment Minutes 40        Charge Justification:  (69810) THERAPEUTIC EXERCISE - Provided verbal/tactile cueing for activities related to strengthening, flexibility, endurance, ROM performed to prevent loss of range of motion, maintain or improve muscular strength or increase flexibility, following either an injury or surgery.   (03386) HOME EXERCISE PROGRAM-

## 2024-11-04 ENCOUNTER — APPOINTMENT (OUTPATIENT)
Dept: PHYSICAL THERAPY | Age: 24
End: 2024-11-04
Payer: COMMERCIAL

## 2024-11-07 ENCOUNTER — HOSPITAL ENCOUNTER (OUTPATIENT)
Dept: PHYSICAL THERAPY | Age: 24
Setting detail: THERAPIES SERIES
Discharge: HOME | End: 2024-11-07
Payer: COMMERCIAL

## 2024-11-07 PROCEDURE — 97140 MANUAL THERAPY 1/> REGIONS: CPT

## 2024-11-07 PROCEDURE — 97110 THERAPEUTIC EXERCISES: CPT

## 2024-11-07 NOTE — FLOWSHEET NOTE
[facility] - Outpatient Rehabilitation and Therapy [address] [place] [address] office: [phone] fax: [phone]           Physical Therapy Re-Certification Plan of Care/MD UPDATE      Dear Dr. Woodard  ,    We had the pleasure of treating the following patient for physical therapy services at [facility].  A summary of our findings can be found in the updated assessment below.  This includes our plan of care.  If you have any questions or concerns regarding these findings, please do not hesitate to contact me at the office phone number checked above.  Thank you for the referral.     Physician Signature:________________________________Date:__________________  By signing above (or electronic signature), therapist’s plan is approved by physician      Current Functional Status:   Jeffry Berry 2000 continues to present with functional deficits in strength symmetry and endurance of strength  limiting ability with  tolerate entire game with running, jumping and pivoting  .  During therapy this date, patient required progression of exercises and program and manual interventions for exercise progression, improving proper muscle recruitment and activation/motor control patterns, static and dynamic balance, and kinesthetic sense and proprioception. Patient will continue to benefit from ongoing evaluation and advanced clinical decision from a Physical Therapist to improve pain control, muscle strength, neuromuscular control, endurance, and high level IADLs to safely return to PLOF and advanced sport activity without symptoms or restrictions.    Overall Response to Treatment:   []Patient is responding well to treatment and improvement is noted with regards to goals   []Patient should continue to improve in reasonable time if they continue HEP   []Patient has plateaued and is no longer responding to skilled PT intervention    []Patient is getting worse and would

## 2024-11-08 NOTE — PLAN OF CARE
criteria necessary for medical necessity for care and/or justification of therapy services:  The patient has a musculoskeletal condition(s) with a corresponding ICD-10 code that is of complexity and severity that require skilled therapeutic intervention. This has a direct and significant impact on the need for therapy and significantly impacts the rate of recovery.     Treatment/Activity Tolerance:  [x] Patient tolerated treatment well [] Patient limited by fatique  [] Patient limited by pain, block    [] Patient limited by other medical complications  [] Other:     Return to Play: NA  Not Ready for Return to Sports    Prognosis for POC: [x] Good [] Fair  [] Poor    Patient requires continued skilled intervention: [x] Yes  [] No      GOALS     Short Term Goals: To be achieved in: 2-4 weeks  Independent in HEP and progression per patient tolerance, in order to progress toward full function and prevent re-injury.               Status: [] Progressing: [x] Met: [] Not Met: [] Adjusted  Patient will have a decrease in pain to 2/10 to help  facilitate improvement in movement, function, and ADLs as indicated by functional deficits.              Status: [] Progressing: [x] Met: [] Not Met: [] Adjusted     Long Term Goals: To be achieved in:  36  weeks  Disability index score of 10% or less for the LEFS to assist with return top prior level of function.                  Status: [] Progressing: [x] Met: [] Not Met: [] Adjusted Currently 35%  LLE AROM = RLE AROM to allow for proper joint functioning as indicated by patients functional deficits.  Status: [] Progressing: [x] Met: [] Not Met: [] Adjusted  Pt to improve strength to show motor control/activation of proximal hip, posterior chain LE, quadriceps, gastroc/soleus, and hamstrings to facilitate functional mobility and ADLs.   Status: [] Progressing: [x] Met: [] Not Met: [] Adjusted  Patient will return to Usual recreational activities, walking around house, getting in/out

## 2024-11-11 ENCOUNTER — APPOINTMENT (OUTPATIENT)
Dept: PHYSICAL THERAPY | Age: 24
End: 2024-11-11
Payer: COMMERCIAL

## 2024-11-12 ENCOUNTER — APPOINTMENT (OUTPATIENT)
Dept: PHYSICAL THERAPY | Age: 24
End: 2024-11-12
Payer: COMMERCIAL

## (undated) DEVICE — TRAP FLUID

## (undated) DEVICE — BANDAGE COMPR W1INXL5YD BGE E ADH TENSOPLAST

## (undated) DEVICE — TUBING, SUCTION, 1/4" X 12', STRAIGHT: Brand: MEDLINE

## (undated) DEVICE — SOLUTION IV 1000ML 0.9% SOD CHL

## (undated) DEVICE — BANDAGE,GAUZE,CONFORMING,3"X75",STRL,LF: Brand: MEDLINE

## (undated) DEVICE — GOWN,SIRUS,POLYRNF,BRTHSLV,XL,30/CS: Brand: MEDLINE

## (undated) DEVICE — GOWN,SIRUS,POLYRNF,BRTHSLV,XLN/XXL,18/CS: Brand: MEDLINE

## (undated) DEVICE — SUTURE ETHLN SZ 3-0 L30IN NONABSORBABLE BLK FS-1 L24MM 3/8 669H

## (undated) DEVICE — E-Z CLEAN, NON-STICK, PTFE COATED, ELECTROSURGICAL BLADE ELECTRODE, 2.5 INCH (6.35 CM): Brand: EZ CLEAN

## (undated) DEVICE — Device: Brand: MICROAIRE®

## (undated) DEVICE — 3M™ COBAN™ NL STERILE NON-LATEX SELF-ADHERENT WRAP, 2086S, 6 IN X 5 YD (15 CM X 4,5 M), 12 ROLLS/CASE: Brand: 3M™ COBAN™

## (undated) DEVICE — COVER LT HNDL BLU PLAS

## (undated) DEVICE — MARKER,SKIN,WI/RULER AND LABELS: Brand: MEDLINE

## (undated) DEVICE — TOWEL,STOP FLAG GOLD N-W: Brand: MEDLINE

## (undated) DEVICE — ELECTRODE PT RET AD L9FT HI MOIST COND ADH HYDRGEL CORDED

## (undated) DEVICE — SUTURE MCRYL + SZ 4-0 L27IN ABSRB UD L19MM PS-2 3/8 CIR MCP426H

## (undated) DEVICE — WRAP COMPR W2INXL5YD TAN SELF ADH COBAN

## (undated) DEVICE — GOWN,SIRUS,POLYRNF,BRTHSLV,XLN/XL,20/CS: Brand: MEDLINE

## (undated) DEVICE — MAT FLR W32XL58IN

## (undated) DEVICE — TUBING PMP L6FT CONT WAVE EXTN

## (undated) DEVICE — KIT ARTHSCP INSTR W/ MTL SPEAR AND DRL DISP FOR 3.5MM

## (undated) DEVICE — SUTURE VCRL SZ 4-0 L18IN ABSRB UD L19MM PS-2 3/8 CIR PRIM J496H

## (undated) DEVICE — TUBING PMP L16FT MAIN DISP FOR AR-6400 AR-6475

## (undated) DEVICE — TRAY,IRRIGATION,BULBSYRINGE,60ML,CSR,PVP: Brand: MEDLINE

## (undated) DEVICE — ELECTROSURGICAL PENCIL ROCKER SWITCH NON COATED BLADE ELECTRODE 10 FT (3 M) CORD HOLSTER: Brand: MEGADYNE

## (undated) DEVICE — ZIMMER® STERILE DISPOSABLE TOURNIQUET CUFF WITH PLC, DUAL PORT, SINGLE BLADDER, 18 IN. (46 CM)

## (undated) DEVICE — BANDAGE COBAN 4 IN COMPR W4INXL5YD FOAM COHESIVE QUIK STK SELF ADH SFT

## (undated) DEVICE — SUTURE VCRL + SZ 3-0 L27IN ABSRB UD CT-2 L26MM 1/2 CIR TAPR VCP232H

## (undated) DEVICE — GARMENT,MEDLINE,DVT,INT,CALF,MED, GEN2: Brand: MEDLINE

## (undated) DEVICE — GLOVE SURG SZ 9 L12IN FNGR THK79MIL GRN LTX FREE

## (undated) DEVICE — SOLUTION IRRIG 3000ML LAC R FLX CONT

## (undated) DEVICE — BLADE SHV L13CM DIA4MM TAPR TIP SCIS LIKE CUT OVL OUTER

## (undated) DEVICE — BLADE,CARBON-STEEL,15,STRL,DISPOSABLE,TB: Brand: MEDLINE

## (undated) DEVICE — PADDING CAST W4INXL4YD HIGHLY ABSRB THAN COT EZ APPL

## (undated) DEVICE — PAD,NON-ADHERENT,3X8,STERILE,LF,1/PK: Brand: MEDLINE

## (undated) DEVICE — SPONGE,LAP,18"X18",DLX,XR,ST,5/PK,40/PK: Brand: MEDLINE

## (undated) DEVICE — BLADE SHV L13CM DIA4MM EXCALIBUR AGG COOLCUT

## (undated) DEVICE — SYRINGE IRRIG 60ML SFT PLIABLE BLB EZ TO GRP 1 HND USE W/

## (undated) DEVICE — LIQUIBAND RAPID ADHESIVE 36/CS 0.8ML: Brand: MEDLINE

## (undated) DEVICE — SUTURE MONOCRYL + SZ 4-0 L27IN ABSRB UD L19MM PS-2 3/8 CIR MCP426H

## (undated) DEVICE — UNDERGLOVE SURG SZ 8 BLU LTX FREE SYN POLYISOPRENE POLYMER

## (undated) DEVICE — TUBING FLD MGMT Y DBL SPIK DUALWAVE

## (undated) DEVICE — STOCKINETTE,DOUBLE PLY,6X48,STERILE: Brand: MEDLINE

## (undated) DEVICE — KNEE ARTHROSCOPY: Brand: MEDLINE INDUSTRIES, INC.

## (undated) DEVICE — GAUZE,SPONGE,4"X4",16PLY,XRAY,STRL,LF: Brand: MEDLINE

## (undated) DEVICE — GLOVE SURG SZ 8 L12IN FNGR THK79MIL GRN LTX FREE

## (undated) DEVICE — SUTURE PDS II SZ 4-0 L18IN ABSRB UD L19MM PS-2 3/8 CIR PRIM Z496G

## (undated) DEVICE — COVER,MAYO STAND,XL,STERILE: Brand: MEDLINE

## (undated) DEVICE — HAND: Brand: MEDLINE INDUSTRIES, INC.

## (undated) DEVICE — BIT DRL L70/39MM DIA1.1MM FOR PILOT H JCBS CHK 1.5MM SCR

## (undated) DEVICE — GLOVE ORTHO 7 1/2   MSG9475

## (undated) DEVICE — YANKAUER,OPEN TIP,W/O VENT,STERILE: Brand: MEDLINE INDUSTRIES, INC.

## (undated) DEVICE — SLING ARM XL L20.25IN D9IN COT POLY BILAT WEB SHLDR STRP

## (undated) DEVICE — SUTURE VCRL + SZ 2-0 L27IN ABSRB UD CT-2 L26MM 1/2 CIR TAPR VCP269H

## (undated) DEVICE — SUTURE VCRL + SZ 0 L27IN ABSRB WHT CT-2 1/2 CIR TAPERPOINT VCP270H

## (undated) DEVICE — GLOVE SURG SZ 9 L1185IN FNGR THK75MIL STRW LTX POLYMER BEAD

## (undated) DEVICE — COLLECTOR TISS AUTOLGS

## (undated) DEVICE — BANDAGE,ELASTIC,ESMARK,STERILE,4"X9',LF: Brand: MEDLINE

## (undated) DEVICE — KNIFE SURG 11MM FOR ACL GRFT RECON

## (undated) DEVICE — COVER,TABLE,HEAVY DUTY,77"X90",STRL: Brand: MEDLINE

## (undated) DEVICE — LEGGINGS, PAIR, 33X51 XL, STERILE: Brand: MEDLINE

## (undated) DEVICE — SUTURE FIBERWIRE SZ 2 W/ TAPERED NEEDLE BLUE L38IN NONABSORB BLU L26.5MM 1/2 CIRCLE AR7200

## (undated) DEVICE — SUTURE COAT VCRL SZ 4-0 L18IN ABSRB UD L19MM PS-2 1/2 CIR J496G

## (undated) DEVICE — SHEET,DRAPE,53X77,STERILE: Brand: MEDLINE

## (undated) DEVICE — SUTURE ETHLN SZ 4-0 L18IN NONABSORBABLE BLK L24MM FS-1 3/8 1629H

## (undated) DEVICE — SUTURE ETHLN SZ 4-0 L18IN NONABSORBABLE BLK L19MM PS-2 3/8 1667H

## (undated) DEVICE — SINGLE USE DEVICE INTENDED TO COVER EXPOSED ENDS OF ORTHOPEDIC PIN AND K-WIRES TO HELP PROTECT THE EXPOSED WIRE FROM SNAGGING ON CLOTHING.: Brand: OXBORO™ PIN COVER

## (undated) DEVICE — SUTURE FIBERWIRE 4-0 L18IN NONABSORBABLE BLU L12.3MM 3/8 AR723001

## (undated) DEVICE — MICRO SAGITTAL BLADE (9.4 X 0.4 X 26.2MM)

## (undated) DEVICE — GUIDEPIN ORTH FLX FOR ACL RECON DISP VERSITOMIC

## (undated) DEVICE — BUR SHV L13CM DIA4MM 8 FLUT OVL FOR RAP AGG BNE RESECT